# Patient Record
Sex: MALE | Race: WHITE | NOT HISPANIC OR LATINO
[De-identification: names, ages, dates, MRNs, and addresses within clinical notes are randomized per-mention and may not be internally consistent; named-entity substitution may affect disease eponyms.]

---

## 2018-01-18 PROBLEM — Z00.00 ENCOUNTER FOR PREVENTIVE HEALTH EXAMINATION: Status: ACTIVE | Noted: 2018-01-18

## 2018-02-15 ENCOUNTER — APPOINTMENT (OUTPATIENT)
Dept: PULMONOLOGY | Facility: CLINIC | Age: 53
End: 2018-02-15
Payer: COMMERCIAL

## 2018-02-15 VITALS
RESPIRATION RATE: 17 BRPM | OXYGEN SATURATION: 98 % | WEIGHT: 229 LBS | HEART RATE: 64 BPM | BODY MASS INDEX: 31.02 KG/M2 | HEIGHT: 72 IN | SYSTOLIC BLOOD PRESSURE: 116 MMHG | DIASTOLIC BLOOD PRESSURE: 82 MMHG

## 2018-02-15 DIAGNOSIS — K21.9 GASTRO-ESOPHAGEAL REFLUX DISEASE W/OUT ESOPHAGITIS: ICD-10-CM

## 2018-02-15 DIAGNOSIS — Z78.9 OTHER SPECIFIED HEALTH STATUS: ICD-10-CM

## 2018-02-15 DIAGNOSIS — Z87.19 PERSONAL HISTORY OF OTHER DISEASES OF THE DIGESTIVE SYSTEM: ICD-10-CM

## 2018-02-15 DIAGNOSIS — Z88.9 ALLERGY STATUS TO UNSPECIFIED DRUGS, MEDICAMENTS AND BIOLOGICAL SUBSTANCES: ICD-10-CM

## 2018-02-15 DIAGNOSIS — Z82.49 FAMILY HISTORY OF ISCHEMIC HEART DISEASE AND OTHER DISEASES OF THE CIRCULATORY SYSTEM: ICD-10-CM

## 2018-02-15 DIAGNOSIS — Z83.3 FAMILY HISTORY OF DIABETES MELLITUS: ICD-10-CM

## 2018-02-15 PROCEDURE — 94618 PULMONARY STRESS TESTING: CPT

## 2018-02-15 PROCEDURE — 99204 OFFICE O/P NEW MOD 45 MIN: CPT | Mod: 25

## 2018-02-15 PROCEDURE — 94010 BREATHING CAPACITY TEST: CPT | Mod: 59

## 2018-02-15 PROCEDURE — 71046 X-RAY EXAM CHEST 2 VIEWS: CPT

## 2018-02-15 RX ORDER — OLOPATADINE HYDROCHLORIDE 665 UG/1
0.6 SPRAY, METERED NASAL
Qty: 3 | Refills: 1 | Status: ACTIVE | COMMUNITY
Start: 2018-02-15 | End: 1900-01-01

## 2018-02-15 RX ORDER — CETIRIZINE HYDROCHLORIDE 10 MG/1
10 CAPSULE, LIQUID FILLED ORAL
Refills: 0 | Status: ACTIVE | COMMUNITY

## 2018-03-29 ENCOUNTER — APPOINTMENT (OUTPATIENT)
Dept: PULMONOLOGY | Facility: CLINIC | Age: 53
End: 2018-03-29

## 2018-07-06 ENCOUNTER — LABORATORY RESULT (OUTPATIENT)
Age: 53
End: 2018-07-06

## 2018-07-07 LAB
24R-OH-CALCIDIOL SERPL-MCNC: 62.6 PG/ML
25(OH)D3 SERPL-MCNC: 33 NG/ML
BASOPHILS # BLD AUTO: 0.02 K/UL
BASOPHILS NFR BLD AUTO: 0.4 %
EOSINOPHIL # BLD AUTO: 0.05 K/UL
EOSINOPHIL NFR BLD AUTO: 1.1 %
HCT VFR BLD CALC: 43.1 %
HGB BLD-MCNC: 14.6 G/DL
IGE SER-MCNC: 20 IU/ML
IMM GRANULOCYTES NFR BLD AUTO: 0.2 %
LYMPHOCYTES # BLD AUTO: 1.55 K/UL
LYMPHOCYTES NFR BLD AUTO: 33.1 %
MAN DIFF?: NORMAL
MCHC RBC-ENTMCNC: 30.3 PG
MCHC RBC-ENTMCNC: 33.9 GM/DL
MCV RBC AUTO: 89.4 FL
MONOCYTES # BLD AUTO: 0.41 K/UL
MONOCYTES NFR BLD AUTO: 8.8 %
NEUTROPHILS # BLD AUTO: 2.64 K/UL
NEUTROPHILS NFR BLD AUTO: 56.4 %
PLATELET # BLD AUTO: 221 K/UL
RBC # BLD: 4.82 M/UL
RBC # FLD: 13.2 %
WBC # FLD AUTO: 4.68 K/UL

## 2018-07-11 LAB
A ALTERNATA IGE QN: 2.88 KUA/L
A FUMIGATUS IGE QN: <0.1 KUA/L
C ALBICANS IGE QN: <0.1 KUA/L
C HERBARUM IGE QN: <0.1 KUA/L
CAT DANDER IGE QN: 0.11 KUA/L
CLAM IGE QN: <0.1 KUA/L
CODFISH IGE QN: <0.1 KUA/L
COMMON RAGWEED IGE QN: 0.25 KUA/L
CORN IGE QN: <0.1 KUA/L
COW MILK IGE QN: <0.1 KUA/L
D FARINAE IGE QN: <0.1 KUA/L
D PTERONYSS IGE QN: <0.1 KUA/L
DEPRECATED A ALTERNATA IGE RAST QL: ABNORMAL
DEPRECATED A FUMIGATUS IGE RAST QL: 0
DEPRECATED C ALBICANS IGE RAST QL: 0
DEPRECATED C HERBARUM IGE RAST QL: 0
DEPRECATED CAT DANDER IGE RAST QL: NORMAL
DEPRECATED CLAM IGE RAST QL: 0
DEPRECATED CODFISH IGE RAST QL: 0
DEPRECATED COMMON RAGWEED IGE RAST QL: NORMAL
DEPRECATED CORN IGE RAST QL: 0
DEPRECATED COW MILK IGE RAST QL: 0
DEPRECATED D FARINAE IGE RAST QL: 0
DEPRECATED D PTERONYSS IGE RAST QL: 0
DEPRECATED DOG DANDER IGE RAST QL: 0
DEPRECATED EGG WHITE IGE RAST QL: 0
DEPRECATED M RACEMOSUS IGE RAST QL: 0
DEPRECATED PEANUT IGE RAST QL: 0
DEPRECATED ROACH IGE RAST QL: 0
DEPRECATED SCALLOP IGE RAST QL: <0.1 KUA/L
DEPRECATED SESAME SEED IGE RAST QL: 0
DEPRECATED SHRIMP IGE RAST QL: 0
DEPRECATED SOYBEAN IGE RAST QL: 0
DEPRECATED TIMOTHY IGE RAST QL: 0
DEPRECATED WALNUT IGE RAST QL: 0
DEPRECATED WHEAT IGE RAST QL: 0
DEPRECATED WHITE OAK IGE RAST QL: 0
DOG DANDER IGE QN: <0.1 KUA/L
EGG WHITE IGE QN: <0.1 KUA/L
M RACEMOSUS IGE QN: <0.1 KUA/L
PEANUT IGE QN: <0.1 KUA/L
ROACH IGE QN: <0.1 KUA/L
SCALLOP IGE QN: 0
SCALLOP IGE QN: <0.1 KUA/L
SESAME SEED IGE QN: <0.1 KUA/L
SOYBEAN IGE QN: <0.1 KUA/L
TIMOTHY IGE QN: <0.1 KUA/L
WALNUT IGE QN: <0.1 KUA/L
WHEAT IGE QN: <0.1 KUA/L
WHITE OAK IGE QN: <0.1 KUA/L

## 2018-08-20 ENCOUNTER — NON-APPOINTMENT (OUTPATIENT)
Age: 53
End: 2018-08-20

## 2018-08-20 ENCOUNTER — APPOINTMENT (OUTPATIENT)
Dept: PULMONOLOGY | Facility: CLINIC | Age: 53
End: 2018-08-20
Payer: COMMERCIAL

## 2018-08-20 VITALS
DIASTOLIC BLOOD PRESSURE: 83 MMHG | HEIGHT: 72 IN | OXYGEN SATURATION: 97 % | BODY MASS INDEX: 29.26 KG/M2 | SYSTOLIC BLOOD PRESSURE: 121 MMHG | HEART RATE: 71 BPM | WEIGHT: 216 LBS

## 2018-08-20 PROCEDURE — 94010 BREATHING CAPACITY TEST: CPT

## 2018-08-20 PROCEDURE — 99214 OFFICE O/P EST MOD 30 MIN: CPT | Mod: 25

## 2018-08-20 RX ORDER — AZELASTINE HYDROCHLORIDE 205.5 UG/1
0.15 SPRAY, METERED NASAL TWICE DAILY
Qty: 3 | Refills: 1 | Status: ACTIVE | COMMUNITY
Start: 2018-08-20 | End: 1900-01-01

## 2018-09-20 ENCOUNTER — APPOINTMENT (OUTPATIENT)
Dept: PULMONOLOGY | Facility: CLINIC | Age: 53
End: 2018-09-20
Payer: COMMERCIAL

## 2018-09-20 VITALS
SYSTOLIC BLOOD PRESSURE: 122 MMHG | RESPIRATION RATE: 17 BRPM | DIASTOLIC BLOOD PRESSURE: 64 MMHG | OXYGEN SATURATION: 98 % | BODY MASS INDEX: 29.39 KG/M2 | HEIGHT: 72 IN | WEIGHT: 217 LBS | HEART RATE: 69 BPM

## 2018-09-20 PROCEDURE — 99214 OFFICE O/P EST MOD 30 MIN: CPT

## 2018-09-20 RX ORDER — BECLOMETHASONE DIPROPIONATE HFA 80 UG/1
80 AEROSOL, METERED RESPIRATORY (INHALATION)
Qty: 3 | Refills: 1 | Status: ACTIVE | COMMUNITY
Start: 2018-09-20 | End: 1900-01-01

## 2018-09-20 NOTE — HISTORY OF PRESENT ILLNESS
[FreeTextEntry1] : Mr. Cortes is a 53 year old male with a history of abnormal PFTs, allergic rhinitis, LPRD, snoring and SOB presenting to the office today for a follow up visit. His chief complaint is \par -he states that he has been having significant nasal congestion\par -he has been producing a clear-white phlegm typically in the morning\par -he notes that he has been wheezing and has had chest tightness\par -he reports that he typically sleeps very well\par -he notes that his hearing, smell and taste have been good\par -he reports that his sinus congestion makes his breathing poor, which makes him feel very fatigued\par -he notes that he has been using the navage and olopatadine nasal spray \par -he denies any headaches, nausea, vomiting, fever, chills, sweats, diarrhea, constipation, dysphagia, dizziness, leg swelling, leg pain, itchy eyes, itchy ears, heartburn, reflux, or sour taste in the mouth, muscle aches or pains, joint aches or pains.

## 2018-09-20 NOTE — ASSESSMENT
[FreeTextEntry1] : Mr. Cortes is a 51 y/o M with PMHx of GERD, mild allergies, deviated septum s/p repair presenting for pulmonary re-evaluation.\par \par SOB is multifactorial due to:\par -overweight \par -out of shape\par -poor mechanics of breathing\par -asthma \par -cardiac disease\par \par Problem 1: allergy/postnasal drip\par -s/p Blood work: asthma panel(+), food IgE panel (-), IgE level (-), eosinophil level (-), and vitamin D level (WNL)\par -recommended to use "Navage" nasal rinse device \par -add Qvar Redihaler 80 2 puffs BID \par -add Astelin 0.15% 1 sniff/nostril BID \par -continue Clarinex 5 mg QHS\par -add Singulair 10 mg QHS \par -OTC antihistamine QHS \par -recommended to use Breathe Right nasal strips at night \par \par \par Problem 2: r/o asthma\par -Methacholine challenge - (needed)\par -Breo Ellipta 100 1 puff QD sample given\par -Asthma is a recurring condition in which the airways tighten and narrow. Asthma can make it difficult to breathe. It can cause coughing, wheezing, and shortness of breath. Asthma episodes, also called asthma attacks, range from minor to life-threatening. Asthma cannot be cured, but medicines and lifestyle changes can help control it. Asthma is believed to be caused by inherited (genetic) and environmental factor, but its exact cause is unknown. Asthma may be triggered by allergens, lung infections, or irritants in the air. Asthma triggers are different for each person  \par -Inhaler technique reviewed as well as oral hygiene techniques reviewed with patient. Avoidance of cold air, extremes of temperature, rescue inhaler should be used before exercise. Order of medication reviewed with patient. Recommended use of a cool mist humidifier in the bedroom. Gargle and spit after use.\par \par Problem 3: poor mechanics of breathing\par -Proper breathing techniques were reviewed with an emphasis of exhalation. Patient instructed to breath in for 1 second and out for four seconds. Patient was encouraged to not talk while walking. \par \par Problem 4: GERD\par -Rule of 2s: avoid eating too much, eating too late, eating too spicy, eating two hours before bed\par -Things to avoid including overeating, spicy foods, tight clothing, eating within three hours of bed, this list is not all inclusive. \par -For treatment of reflux, possible options discussed including diet control, H2 blockers, PPIs, as well as coating motility agents discussed as treatment options. Timing of meals and proximity of last meal to sleep were discussed. If symptoms persist, a formal gastrointestinal evaluation is needed.\par \par Problem 5: r/o KATYA\par -abnormal inspiratory limb on PFTs\par -recommend home sleep study based on neck size and elevated Mallampati class\par Sleep apnea is associated with adverse clinical consequences which an affect most organ systems.  Cardiovascular disease risk includes arrhythmias, atrial fibrillation, hypertension, coronary artery disease, and stroke. Metabolic disorders include diabetes type 2, non-alcoholic fatty liver disease. Mood disorder especially depression; and cognitive decline especially in the elderly. Associations with  chronic reflux/Paz’s esophagus some but not all inclusive. \par -Reasons  include arousal consistent with hypopnea; respiratory events most prominent in REM sleep or supine position; therefore sleep staging and body position are important for accurate diagnosis and estimation of AHI. \par  \par Problem 6: health maintenance\par -encouraged to stay hydrated especially when exercising \par \par Follow up in 4 weeks.\par He is encouraged to call with any questions, concerns, or changes.

## 2018-09-20 NOTE — ADDENDUM
[FreeTextEntry1] : All medical record entries made by marysol Isabel were at Dr. Steven Sweet's, direction and personally dictated by me on (9/20/2018). I have reviewed the chart and agree that the record accurately reflects my personal performance of the history, physical exam, assessment and plan. I have also personally directed, reviewed, and agree with the discharge instructions.

## 2018-09-20 NOTE — REVIEW OF SYSTEMS
[Negative] : Sleep Disorder [Nasal Congestion] : nasal congestion [As Noted in HPI] : as noted in HPI [Sputum] : sputum  [Wheezing] : wheezing

## 2018-09-20 NOTE — REASON FOR VISIT
[Follow-Up] : a follow-up visit [FreeTextEntry1] : abnormal PFTs, allergic rhinitis, LPRD, snoring and SOB

## 2018-12-11 ENCOUNTER — APPOINTMENT (OUTPATIENT)
Dept: PULMONOLOGY | Facility: CLINIC | Age: 53
End: 2018-12-11

## 2019-01-07 ENCOUNTER — TRANSCRIPTION ENCOUNTER (OUTPATIENT)
Age: 54
End: 2019-01-07

## 2019-01-12 ENCOUNTER — RX RENEWAL (OUTPATIENT)
Age: 54
End: 2019-01-12

## 2019-01-14 ENCOUNTER — MEDICATION RENEWAL (OUTPATIENT)
Age: 54
End: 2019-01-14

## 2019-01-14 ENCOUNTER — RX RENEWAL (OUTPATIENT)
Age: 54
End: 2019-01-14

## 2019-01-14 RX ORDER — MONTELUKAST 10 MG/1
10 TABLET, FILM COATED ORAL
Qty: 1 | Refills: 1 | Status: ACTIVE | COMMUNITY
Start: 2018-09-20 | End: 1900-01-01

## 2019-03-03 ENCOUNTER — TRANSCRIPTION ENCOUNTER (OUTPATIENT)
Age: 54
End: 2019-03-03

## 2019-03-11 ENCOUNTER — NON-APPOINTMENT (OUTPATIENT)
Age: 54
End: 2019-03-11

## 2019-03-11 ENCOUNTER — APPOINTMENT (OUTPATIENT)
Dept: PULMONOLOGY | Facility: CLINIC | Age: 54
End: 2019-03-11
Payer: COMMERCIAL

## 2019-03-11 VITALS
BODY MASS INDEX: 28.31 KG/M2 | HEIGHT: 72 IN | WEIGHT: 209 LBS | SYSTOLIC BLOOD PRESSURE: 114 MMHG | RESPIRATION RATE: 17 BRPM | DIASTOLIC BLOOD PRESSURE: 60 MMHG | OXYGEN SATURATION: 94 % | HEART RATE: 83 BPM

## 2019-03-11 DIAGNOSIS — J30.9 ALLERGIC RHINITIS, UNSPECIFIED: ICD-10-CM

## 2019-03-11 DIAGNOSIS — R06.83 SNORING: ICD-10-CM

## 2019-03-11 DIAGNOSIS — R94.2 ABNORMAL RESULTS OF PULMONARY FUNCTION STUDIES: ICD-10-CM

## 2019-03-11 DIAGNOSIS — K21.9 GASTRO-ESOPHAGEAL REFLUX DISEASE W/OUT ESOPHAGITIS: ICD-10-CM

## 2019-03-11 DIAGNOSIS — R06.02 SHORTNESS OF BREATH: ICD-10-CM

## 2019-03-11 PROCEDURE — 99214 OFFICE O/P EST MOD 30 MIN: CPT | Mod: 25

## 2019-03-11 PROCEDURE — 94010 BREATHING CAPACITY TEST: CPT

## 2019-03-11 RX ORDER — RANITIDINE 300 MG/1
300 TABLET ORAL
Qty: 90 | Refills: 1 | Status: ACTIVE | COMMUNITY
Start: 2019-03-11 | End: 1900-01-01

## 2019-03-11 NOTE — HISTORY OF PRESENT ILLNESS
[FreeTextEntry1] : Mr. Cortes is a 53 year old male with a history of abnormal PFTs, allergic rhinitis, LPRD, snoring and SOB presenting to the office today for a follow up visit. His chief complaint is mucus production\par - While his chest pain and  pressure has improved, the amount of sputum he coughs up has increased (about the size of a golf ball)\par - He notes that it occurs morning into the afternoon\par - He states that for the last few weeks, his sputum has been green. \par - After bringing up sputum, he feels improved\par - In the past, his sputum has included blood. \par - he has had no issues with running aside from soreness in his legs, which he expects. \par - He has lost around 10 pounds through diet modification. \par - He states that he is unable to breathe through his nose\par - he wakes up every morning around 3-4 AM to spit up. \par - He denies any headaches, nausea, vomiting, fever, chills, sweats, palpitations, SOB, coughing, wheezing, fatigue, diarrhea, constipation, dysphagia, myalgias, dizziness, leg swelling, leg pain, itchy eyes, itchy ears, heartburn, reflux, or sour taste in the mouth.

## 2019-03-11 NOTE — PROCEDURE
[FreeTextEntry1] : PFT- spi reveals mild obstructive dysfunction; FEV1 was 3.17L which is 76% of predicted; normal flow volume loop

## 2019-03-11 NOTE — ADDENDUM
[FreeTextEntry1] : Documented by Fab Bess acting as a scribe for Dr. Steven Sweet on 3/11/2019\par \par All medical record entries made by the Scribe were at my, Dr. Steven Sweet's, direction and personally dictated by me on 3/11/2019. I have reviewed the chart and agree that the record accurately reflects my personal performance of the history, physical exam, assessment and plan. I have also personally directed, reviewed, and agree with the discharge instructions. \par \par \par \par \par

## 2019-03-11 NOTE — PHYSICAL EXAM
[General Appearance - Well Developed] : well developed [Normal Appearance] : normal appearance [Well Groomed] : well groomed [General Appearance - Well Nourished] : well nourished [No Deformities] : no deformities [General Appearance - In No Acute Distress] : no acute distress [Normal Conjunctiva] : the conjunctiva exhibited no abnormalities [Eyelids - No Xanthelasma] : the eyelids demonstrated no xanthelasmas [Normal Oropharynx] : normal oropharynx [II] : II [Neck Appearance] : the appearance of the neck was normal [Neck Cervical Mass (___cm)] : no neck mass was observed [Jugular Venous Distention Increased] : there was no jugular-venous distention [Thyroid Diffuse Enlargement] : the thyroid was not enlarged [Thyroid Nodule] : there were no palpable thyroid nodules [Heart Rate And Rhythm] : heart rate and rhythm were normal [Heart Sounds] : normal S1 and S2 [Murmurs] : no murmurs present [Respiration, Rhythm And Depth] : normal respiratory rhythm and effort [Exaggerated Use Of Accessory Muscles For Inspiration] : no accessory muscle use [Auscultation Breath Sounds / Voice Sounds] : lungs were clear to auscultation bilaterally [Abdomen Soft] : soft [Abdomen Tenderness] : non-tender [Abdomen Mass (___ Cm)] : no abdominal mass palpated [Abnormal Walk] : normal gait [Gait - Sufficient For Exercise Testing] : the gait was sufficient for exercise testing [Nail Clubbing] : no clubbing of the fingernails [Cyanosis, Localized] : no localized cyanosis [Petechial Hemorrhages (___cm)] : no petechial hemorrhages [Skin Color & Pigmentation] : normal skin color and pigmentation [] : no rash [No Venous Stasis] : no venous stasis [Skin Lesions] : no skin lesions [No Skin Ulcers] : no skin ulcer [No Xanthoma] : no  xanthoma was observed [Deep Tendon Reflexes (DTR)] : deep tendon reflexes were 2+ and symmetric [Sensation] : the sensory exam was normal to light touch and pinprick [No Focal Deficits] : no focal deficits [Oriented To Time, Place, And Person] : oriented to person, place, and time [Impaired Insight] : insight and judgment were intact [Affect] : the affect was normal [FreeTextEntry1] : Ventral hernia

## 2019-03-11 NOTE — ASSESSMENT
[FreeTextEntry1] : Mr. Cortes is a 51 y/o M with PMHx of GERD, mild allergies, deviated septum s/p repair presenting for pulmonary re-evaluation. He presents to the office still symptomatic of mucus prodcution. \par \par SOB is multifactorial due to:\par -overweight \par -out of shape\par -poor mechanics of breathing\par -asthma \par -cardiac disease\par \par Problem 1: allergy/postnasal drip\par -s/p Blood work: asthma panel(+), food IgE panel (-), IgE level (-), eosinophil level (-), and vitamin D level (WNL)\par -recommended to use "Navage" nasal rinse device \par - Continue Qvar Redihaler 80 2 puffs BID \par - Continue  Astelin 0.15% 1 sniff/nostril BID \par -continue Clarinex 5 mg QHS\par - Continue Singulair 10 mg QHS \par -OTC antihistamine QHS \par -recommended to use Breathe Right nasal strips at night \par \par \par Problem 2: r/o asthma\par -Methacholine challenge - (needed)\par - Add Trelegy 1 inhalation QD \par -Asthma is a recurring condition in which the airways tighten and narrow. Asthma can make it difficult to breathe. It can cause coughing, wheezing, and shortness of breath. Asthma episodes, also called asthma attacks, range from minor to life-threatening. Asthma cannot be cured, but medicines and lifestyle changes can help control it. Asthma is believed to be caused by inherited (genetic) and environmental factor, but its exact cause is unknown. Asthma may be triggered by allergens, lung infections, or irritants in the air. Asthma triggers are different for each person  \par -Inhaler technique reviewed as well as oral hygiene techniques reviewed with patient. Avoidance of cold air, extremes of temperature, rescue inhaler should be used before exercise. Order of medication reviewed with patient. Recommended use of a cool mist humidifier in the bedroom. Gargle and spit after use.\par \par Problem 3: poor mechanics of breathing\par -Proper breathing techniques were reviewed with an emphasis of exhalation. Patient instructed to breath in for 1 second and out for four seconds. Patient was encouraged to not talk while walking. \par \par Problem 4: GERD\par - Add Omeprazole 40 mg before breakfast\par -Rule of 2s: avoid eating too much, eating too late, eating too spicy, eating two hours before bed\par -Things to avoid including overeating, spicy foods, tight clothing, eating within three hours of bed, this list is not all inclusive. \par -For treatment of reflux, possible options discussed including diet control, H2 blockers, PPIs, as well as coating motility agents discussed as treatment options. Timing of meals and proximity of last meal to sleep were discussed. If symptoms persist, a formal gastrointestinal evaluation is needed.\par \par Problem 5: r/o KATYA\par -abnormal inspiratory limb on PFTs\par -recommend home sleep study based on neck size and elevated Mallampati class\par Sleep apnea is associated with adverse clinical consequences which an affect most organ systems.  Cardiovascular disease risk includes arrhythmias, atrial fibrillation, hypertension, coronary artery disease, and stroke. Metabolic disorders include diabetes type 2, non-alcoholic fatty liver disease. Mood disorder especially depression; and cognitive decline especially in the elderly. Associations with  chronic reflux/Paz’s esophagus some but not all inclusive. \par -Reasons  include arousal consistent with hypopnea; respiratory events most prominent in REM sleep or supine position; therefore sleep staging and body position are important for accurate diagnosis and estimation of AHI. \par  \par Problem 6: health maintenance\par -encouraged to stay hydrated especially when exercising \par \par Follow up in 4 weeks.\par He is encouraged to call with any questions, concerns, or changes.

## 2019-03-19 ENCOUNTER — RX RENEWAL (OUTPATIENT)
Age: 54
End: 2019-03-19

## 2019-03-19 RX ORDER — DESLORATADINE 5 MG/1
5 TABLET ORAL
Qty: 30 | Refills: 5 | Status: ACTIVE | COMMUNITY
Start: 2018-08-20 | End: 1900-01-01

## 2019-03-29 ENCOUNTER — APPOINTMENT (OUTPATIENT)
Dept: PULMONOLOGY | Facility: CLINIC | Age: 54
End: 2019-03-29
Payer: COMMERCIAL

## 2019-03-29 VITALS — WEIGHT: 204 LBS | HEIGHT: 70.08 IN | BODY MASS INDEX: 29.2 KG/M2

## 2019-03-29 PROCEDURE — 95070 INHLJ BRNCL CHALLENGE TSTG: CPT

## 2019-03-29 PROCEDURE — 94070 EVALUATION OF WHEEZING: CPT

## 2019-04-08 DIAGNOSIS — J45.909 UNSPECIFIED ASTHMA, UNCOMPLICATED: ICD-10-CM

## 2019-04-08 RX ORDER — ALBUTEROL SULFATE 90 UG/1
108 (90 BASE) AEROSOL, METERED RESPIRATORY (INHALATION)
Qty: 1 | Refills: 3 | Status: ACTIVE | COMMUNITY
Start: 2019-04-08 | End: 1900-01-01

## 2019-04-08 RX ORDER — FLUTICASONE FUROATE AND VILANTEROL TRIFENATATE 200; 25 UG/1; UG/1
200-25 POWDER RESPIRATORY (INHALATION) DAILY
Qty: 1 | Refills: 3 | Status: ACTIVE | COMMUNITY
Start: 2019-04-08 | End: 1900-01-01

## 2019-07-23 ENCOUNTER — APPOINTMENT (OUTPATIENT)
Dept: PULMONOLOGY | Facility: CLINIC | Age: 54
End: 2019-07-23

## 2023-03-05 ENCOUNTER — EMERGENCY (EMERGENCY)
Facility: HOSPITAL | Age: 58
LOS: 1 days | Discharge: HOME | End: 2023-03-05
Attending: STUDENT IN AN ORGANIZED HEALTH CARE EDUCATION/TRAINING PROGRAM | Admitting: STUDENT IN AN ORGANIZED HEALTH CARE EDUCATION/TRAINING PROGRAM
Payer: COMMERCIAL

## 2023-03-05 VITALS
HEART RATE: 64 BPM | DIASTOLIC BLOOD PRESSURE: 56 MMHG | SYSTOLIC BLOOD PRESSURE: 101 MMHG | RESPIRATION RATE: 14 BRPM | OXYGEN SATURATION: 100 % | TEMPERATURE: 98 F

## 2023-03-05 VITALS
TEMPERATURE: 98 F | RESPIRATION RATE: 14 BRPM | HEART RATE: 73 BPM | DIASTOLIC BLOOD PRESSURE: 78 MMHG | SYSTOLIC BLOOD PRESSURE: 113 MMHG | OXYGEN SATURATION: 100 %

## 2023-03-05 PROCEDURE — 73552 X-RAY EXAM OF FEMUR 2/>: CPT | Mod: 26,RT

## 2023-03-05 PROCEDURE — 73590 X-RAY EXAM OF LOWER LEG: CPT | Mod: 26,RT

## 2023-03-05 PROCEDURE — 99285 EMERGENCY DEPT VISIT HI MDM: CPT

## 2023-03-05 PROCEDURE — 73502 X-RAY EXAM HIP UNI 2-3 VIEWS: CPT | Mod: 26,RT

## 2023-03-05 PROCEDURE — 73700 CT LOWER EXTREMITY W/O DYE: CPT | Mod: 26,RT,MA

## 2023-03-05 PROCEDURE — 73562 X-RAY EXAM OF KNEE 3: CPT | Mod: 26,RT

## 2023-03-05 PROCEDURE — 73610 X-RAY EXAM OF ANKLE: CPT | Mod: 26,RT

## 2023-03-05 RX ORDER — TETANUS TOXOID, REDUCED DIPHTHERIA TOXOID AND ACELLULAR PERTUSSIS VACCINE, ADSORBED 5; 2.5; 8; 8; 2.5 [IU]/.5ML; [IU]/.5ML; UG/.5ML; UG/.5ML; UG/.5ML
0.5 SUSPENSION INTRAMUSCULAR ONCE
Refills: 0 | Status: COMPLETED | OUTPATIENT
Start: 2023-03-05 | End: 2023-03-05

## 2023-03-05 RX ORDER — IBUPROFEN 200 MG
600 TABLET ORAL ONCE
Refills: 0 | Status: COMPLETED | OUTPATIENT
Start: 2023-03-05 | End: 2023-03-05

## 2023-03-05 RX ORDER — OXYCODONE HYDROCHLORIDE 5 MG/1
5 TABLET ORAL ONCE
Refills: 0 | Status: DISCONTINUED | OUTPATIENT
Start: 2023-03-05 | End: 2023-03-05

## 2023-03-05 RX ORDER — KETOROLAC TROMETHAMINE 30 MG/ML
15 SYRINGE (ML) INJECTION ONCE
Refills: 0 | Status: DISCONTINUED | OUTPATIENT
Start: 2023-03-05 | End: 2023-03-05

## 2023-03-05 RX ORDER — ACETAMINOPHEN 500 MG
975 TABLET ORAL ONCE
Refills: 0 | Status: COMPLETED | OUTPATIENT
Start: 2023-03-05 | End: 2023-03-05

## 2023-03-05 RX ORDER — OXYCODONE HYDROCHLORIDE 5 MG/1
1 TABLET ORAL
Qty: 12 | Refills: 0
Start: 2023-03-05 | End: 2023-03-08

## 2023-03-05 RX ORDER — MORPHINE SULFATE 50 MG/1
4 CAPSULE, EXTENDED RELEASE ORAL ONCE
Refills: 0 | Status: DISCONTINUED | OUTPATIENT
Start: 2023-03-05 | End: 2023-03-05

## 2023-03-05 RX ADMIN — Medication 600 MILLIGRAM(S): at 14:42

## 2023-03-05 RX ADMIN — OXYCODONE HYDROCHLORIDE 5 MILLIGRAM(S): 5 TABLET ORAL at 14:42

## 2023-03-05 RX ADMIN — Medication 975 MILLIGRAM(S): at 14:12

## 2023-03-05 RX ADMIN — Medication 600 MILLIGRAM(S): at 14:12

## 2023-03-05 RX ADMIN — OXYCODONE HYDROCHLORIDE 5 MILLIGRAM(S): 5 TABLET ORAL at 14:12

## 2023-03-05 RX ADMIN — Medication 975 MILLIGRAM(S): at 14:42

## 2023-03-05 RX ADMIN — TETANUS TOXOID, REDUCED DIPHTHERIA TOXOID AND ACELLULAR PERTUSSIS VACCINE, ADSORBED 0.5 MILLILITER(S): 5; 2.5; 8; 8; 2.5 SUSPENSION INTRAMUSCULAR at 17:56

## 2023-03-05 RX ADMIN — Medication 15 MILLIGRAM(S): at 19:09

## 2023-03-05 NOTE — ED PROVIDER NOTE - PROVIDER TOKENS
PROVIDER:[TOKEN:[[id number]],FOLLOWUP:[1-3 Days]],PROVIDER:[TOKEN:[[id number]],FOLLOWUP:[1-3 Days]]

## 2023-03-05 NOTE — ED ADULT TRIAGE NOTE - CHIEF COMPLAINT QUOTE
pt c/o right knee pain after twisting injury today, unable to ambulate freely/ weight bear. PMH: GERD

## 2023-03-05 NOTE — ED PROVIDER NOTE - CARE PROVIDER_API CALL
Steven Arauz (MD)  Orthopedics  [address] [facility], [address] [place] [id number]  Phone: [phone]  Fax: [phone]  Follow Up Time: 1-3 Days    Emigdio Sanchez)  Orthopaedic Surgery  [address] [facility], [facility] [address]  [place] [address]  Phone: [phone]  Fax: [phone]  Follow Up Time: 1-3 Days

## 2023-03-05 NOTE — ED PROVIDER NOTE - ATTENDING CONTRIBUTION TO CARE
I have personally performed a face to face medical and diagnostic evaluation of the patient. I have discussed with and reviewed the Resident's note and agree with the History, ROS, Physical Exam and MDM unless otherwise indicated. A brief summary of my personal evaluation and impression can be found below.    Vern ATKINSON: 57-year-old female, no past medical history, status post prior meniscus repair in the 90s, presents with a chief complaint of right knee pain and swelling that started suddenly today after a twisting injury when he was attempting to put back a shopping cart, patient report he twisted awkwardly felt a popping sensation and then collapsed, no head strike no LOC recalls entire event, has been noticing increasing pain swelling and numbness and tingling sensation to the distal aspect of his right lower extremity prompting ED visit, noted trace abrasions to his right shin, he can distally move everything and feel everything was reporting some subjective numbness, no other injuries no other complaints.  He reports he is having difficulty ambulating since event is having difficulty straightening of the leg secondary to discomfort.    All other ROS negative, except as above and as per HPI and ROS section.    VITALS: Initial triage and subsequent vitals have been reviewed by me.  GEN APPEARANCE: WDWN, alert, non-toxic, NAD  HEAD: Atraumatic.  EYES: PERRLa, EOMI, vision grossly intact.   NECK: Supple  CV: RRR, S1S2, no c/r/m/g. Cap refill <2 seconds. No bruits.   LUNGS: CTAB. No abnormal breath sounds.  ABDOMEN: Soft, NTND. No guarding or rebound.   MSK/EXT: right mid thigh swelling w tenderness to the right knee, swelling proceeds down to the right lower extremity, his compartments are soft, +2 DP, distally neurovascular intact, limited range of motion of the right knee secondary to discomfort otherwise, No spinal or extremity point tenderness. No CVA ttp. Pelvis stable. No peripheral edema.  NEURO: Alert, follows commands. Weight bearing normal. Speech normal. Sensation and motor normal x4 extremities.   SKIN: Warm, dry and intact. No rash.  PSYCH: Appropriate    Plan/MDM: Vern ATKINSON: 57-year-old female, no past medical history, status post prior meniscus repair in the 90s, presents with a chief complaint of right knee pain and swelling that started suddenly today after a twisting injury when he was attempting to put back a shopping cart, patient report he twisted awkwardly felt a popping sensation and then collapsed, no head strike no LOC recalls entire event, has been noticing increasing pain swelling and numbness and tingling sensation to the distal aspect of his right lower extremity prompting ED visit, noted trace abrasions to his right shin, he can distally move everything and feel everything was reporting some subjective numbness, no other injuries no other complaints.  He reports he is having difficulty ambulating since event is having difficulty straightening of the leg secondary to discomfort. Exam vital signs stable nontoxic-appearing physical exam as above, DDx unclear etiology of patient's episode, consider meniscal versus tendon injury or ligament injury, consider possible fracture though less likely given that patient did not have a direct fall and only twisted his knee, dislocation relocation is possible, however he appears to have a good distal lower extremity pulse can move everything and feel everything, his presentation is not consistent with compartment syndrome as his compartments are soft, and he is distally neurovascular tact, will check x-ray, consider CTA to evaluate for popliteal artery injury though less likely, will consider orthopedics consult as indicated, presentation not concerning for septic joint as there is no fever likewise as above dictated by injury, will give meds and reassess and dispo accordingly thereafter.

## 2023-03-05 NOTE — ED ADULT NURSE REASSESSMENT NOTE - NS ED NURSE REASSESS COMMENT FT1
patient educated to wait for ortho to come immobilize his leg prior to going to CT per ortho recs. patient expresses understanding. CT wait times explained to patient and family member at bedside, verbalizes understanding.

## 2023-03-05 NOTE — ED PROVIDER NOTE - OBJECTIVE STATEMENT
57 year old male with hx of right meniscus repair in 1994 comes into the ED for evaluation of right knee pain after he twisted it while walking. He was putting away a shopping cart and turned quickly and twisted his knee. He notes feeling a pop in his knee and he fell to the ground where he possible also rolled his right ankle. Immediately after he was not able to bear any weight on the right leg, he was not able to straighten out the leg, and he noticed the knee and his ankle began to swell. Using a pole and his wife for support, he went to an urgent care where he was sent to the ED. He has significant pain to the right knee and is unable to move it at all secondary to pain. He denies any trauma to his head. He is not on any medications including blood thinners.

## 2023-03-05 NOTE — ED ADULT NURSE NOTE - NSIMPLEMENTINTERV_GEN_ALL_ED
Implemented All Fall Risk Interventions:  [place] to call system. Call bell, personal items and telephone within reach. Instruct patient to call for assistance. Room bathroom lighting operational. Non-slip footwear when patient is off stretcher. Physically safe environment: no spills, clutter or unnecessary equipment. Stretcher in lowest position, wheels locked, appropriate side rails in place. Provide visual cue, wrist band, yellow gown, etc. Monitor gait and stability. Monitor for mental status changes and reorient to person, place, and time. Review medications for side effects contributing to fall risk. Reinforce activity limits and safety measures with patient and family.

## 2023-03-05 NOTE — ED ADULT TRIAGE NOTE - GLASGOW COMA SCALE: BEST VERBAL RESPONSE, MLM
"Interval HPI:   Overnight events:    Pt with low BG in AM due to insulin stacking last night    In addition noted to have elevated BG at 11AM due to pt not receiving pre-meal insulin for his breakfast    Eatin%  Nausea: No  Hypoglycemia and intervention: No  Fever: No  TPN and/or TF: No  If yes, type of TF/TPN and rate: NA    BP (!) 162/79 (BP Location: Right arm, Patient Position: Lying)   Pulse 72   Temp 97.7 °F (36.5 °C) (Oral)   Resp 20   Ht 6' 2" (1.88 m)   Wt 76.4 kg (168 lb 6.9 oz)   SpO2 97%   BMI 21.63 kg/m²     Labs Reviewed and Include    Recent Labs   Lab 22  0556   GLU 51*   CALCIUM 8.1*   ALBUMIN 2.0*   PROT 6.2      K 3.6   CO2 29      BUN 11   CREATININE 0.7   ALKPHOS 122   ALT 16   AST 27   BILITOT 0.5     Lab Results   Component Value Date    WBC 7.34 2022    HGB 11.6 (L) 2022    HCT 36.2 (L) 2022    MCV 86 2022     2022     No results for input(s): TSH, FREET4 in the last 168 hours.  Lab Results   Component Value Date    HGBA1C 11.5 (H) 2022       Nutritional status:   Body mass index is 21.63 kg/m².  Lab Results   Component Value Date    ALBUMIN 2.0 (L) 2022    ALBUMIN 1.9 (L) 2022    ALBUMIN 1.9 (L) 2022     No results found for: PREALBUMIN    Estimated Creatinine Clearance: 94 mL/min (based on SCr of 0.7 mg/dL).    Accu-Checks  Recent Labs     22  0743 22  1154 22  1635 22  1854 22  0742 22  0814 22  0947 22  1100   POCTGLUCOSE 299* 119* 196* 351* 380* 372* 45* 69* 171* 278*       Current Medications and/or Treatments Impacting Glycemic Control  Immunotherapy:    Immunosuppressants       None          Steroids:   Hormones (From admission, onward)                Start     Stop Route Frequency Ordered    22 182  melatonin tablet 6 mg         -- Oral Nightly PRN 22 1724          Pressors:    Autonomic Drugs (From " admission, onward)                None          Hyperglycemia/Diabetes Medications:   Antihyperglycemics (From admission, onward)                Start     Stop Route Frequency Ordered    03/06/22 2100  insulin detemir U-100 pen 8 Units         -- SubQ Nightly 03/06/22 0854    03/06/22 1130  insulin aspart U-100 pen 4-8 Units         -- SubQ 3 times daily with meals 03/06/22 1043    03/05/22 1145  insulin aspart U-100 pen 0-5 Units         -- SubQ Before meals, nightly and at 0100 PRN 03/05/22 1034           (V5) oriented

## 2023-03-05 NOTE — ED ADULT NURSE NOTE - OBJECTIVE STATEMENT
Received patient in Intake 10B c/o right knee pain, hx of injury today. Patient is A&OX4, airway patent, breathing unlabored and even. Medications given as ordered. Side rails up and safety maintained. Fall precaution in place. Received patient in Intake 10B c/o right knee and right ankle injury today, patient reports abrasions on right knee, right ankle is swollen and patient fell today. Patient is A&OX4, airway patent, breathing unlabored and even. Medications given as ordered. Side rails up and safety maintained. Fall precaution in place. Received patient in Intake 10B c/o right knee pain and right ankle pain hx of injury today. Patient c/o abrasions on right knee, right ankle is swollen and patient fell today. Patient is A&OX4, airway patent, breathing unlabored and even. Medications given as ordered. Side rails up and safety maintained. Fall precaution in place.

## 2023-03-05 NOTE — ED PROVIDER NOTE - PATIENT PORTAL LINK FT
You can access the FollowMyHealth Patient Portal offered by [facility] by registering at the following website: [email or web address]. By joining [facility]’s FollowMyHealth portal, you will also be able to view your health information using other applications (apps) compatible with our system.

## 2023-03-05 NOTE — ED PROVIDER NOTE - PROGRESS NOTE DETAILS
Butch ATKINSON: Pt is stable and ready for discharge. Strict return precautions given. All questions answered. Butch ATKINSON: Pt is stable and ready for discharge. Strict return precautions given. All questions answered. Spoke w/ orthopedics resident Jaki Worrell who agrees w/ plan to discharge as there are no signs of compartment syndrome and pt is tolerating pain.
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
## 2023-03-05 NOTE — CONSULT NOTE ADULT - SUBJECTIVE AND OBJECTIVE BOX
Orthopedic Surgery Consult Note    This is a 57y Male who presents to the ED today with c/o right knee pain, swelling and unable to bear weight s/p after a twist and fall while returning a grocery cart. No headstrike/LOC. Patient has been unable to ambulate since the fall. Patient denies numbness/tingling in affected extremity. Denies other bone/joint complaints. Patient is an independent community ambulator at baseline. Patient has a prior R knee arthroscopic partial medial meniscectomy performed at an OSH in the 1990s.    No Known Allergies        PE  RLE:  Superficial abrasions, no concern for open injury  Swelling about knee and proximal lower leg, no erythema or ecchymosis  Motor intact EHL/FHL/TA/Gastrocnemius  Painless ROM hip/ankle  SILT DP/SP/S/S/T nerve distributions  Compartments soft and compressible, no pain with passive stretch of anterior/posterior/lateral compartments  2+ Dorsalis Pedis pulse     Secondary: No TTP over bony prominences. SILT b/l, ROM intact b/l. Distal pulses palpable.     Imaging:  X-rays of the right knee demonstrate R Schatzker 2 tibial plateau fracture    Procedure:  Application of bulky farmer dressing and knee immobilizer. Patient tolerated procedure well and was NVI after    Assessment:  57y Male with a right tibial plateau fracture.    Plan:  - NWB RLE in bulky farmer knee immobilizer  - CT R knee for preop planning  - Pain control  - ice, elevation  - Pt advised surgical fixation of proximal tibia will be necessary; pt understands and is agreeable for surgery    Splint precautions:  Keep splint dry  Elevate extremity, can try and ice through the splint  Do not stick anything into the splint  Monitor for signs of pressure build up from swelling: pain not controlled with Tylenol/motrin, severe pain when moves the fingers/toes, numbness/tingling. If signs develop, call the office or return to ED immediately.     Patient should follow up with Dr. Carrasco in the office this week, call [phone] for appt.

## 2023-03-05 NOTE — ED ADULT NURSE REASSESSMENT NOTE - NS ED NURSE REASSESS COMMENT FT1
pt medicated as ordered. md to reevaluate for d/c instructions. evaluate by ortho resident earlier ,splint in place r leg

## 2023-03-05 NOTE — ED PROVIDER NOTE - NS ED MD DISPO DISCHARGE CCDA
Dr. Barrios Sites @ bedside for consult.
Dr. Gil @ bedside. Verbal order form Dr. Emory Cardenas mcg Digoxin IVP now.  RBV
Dr. Josse Kang notified patient remains in [place] flutter . Teephone orders received to titrate Cardizem drip up to 15mg/he and give Metoprolol 5 mg IVP now.  RBV
Patient reports feeling dizzy. FSBS 47. Dr. Mukund Morley notified. Verbal orders for 1 amp D 50% IVP. RBV.
Patient/Caregiver provided printed discharge information.

## 2023-03-07 ENCOUNTER — NON-APPOINTMENT (OUTPATIENT)
Age: 58
End: 2023-03-07

## 2023-03-08 ENCOUNTER — APPOINTMENT (OUTPATIENT)
Dept: ORTHOPEDIC SURGERY | Facility: CLINIC | Age: 58
End: 2023-03-08
Payer: COMMERCIAL

## 2023-03-08 ENCOUNTER — NON-APPOINTMENT (OUTPATIENT)
Age: 58
End: 2023-03-08

## 2023-03-08 ENCOUNTER — APPOINTMENT (OUTPATIENT)
Dept: ORTHOPEDIC SURGERY | Facility: CLINIC | Age: 58
End: 2023-03-08

## 2023-03-08 VITALS
SYSTOLIC BLOOD PRESSURE: 106 MMHG | BODY MASS INDEX: 30.06 KG/M2 | HEIGHT: 70 IN | WEIGHT: 210 LBS | HEART RATE: 92 BPM | DIASTOLIC BLOOD PRESSURE: 67 MMHG

## 2023-03-08 PROCEDURE — 99203 OFFICE O/P NEW LOW 30 MIN: CPT

## 2023-03-09 ENCOUNTER — TRANSCRIPTION ENCOUNTER (OUTPATIENT)
Age: 58
End: 2023-03-09

## 2023-03-09 ENCOUNTER — OUTPATIENT (OUTPATIENT)
Dept: OUTPATIENT SERVICES | Facility: HOSPITAL | Age: 58
LOS: 1 days | End: 2023-03-09
Payer: COMMERCIAL

## 2023-03-09 VITALS
TEMPERATURE: 98 F | HEART RATE: 88 BPM | WEIGHT: 210.1 LBS | DIASTOLIC BLOOD PRESSURE: 82 MMHG | HEIGHT: 72 IN | OXYGEN SATURATION: 98 % | RESPIRATION RATE: 16 BRPM | SYSTOLIC BLOOD PRESSURE: 120 MMHG

## 2023-03-09 DIAGNOSIS — Z98.890 OTHER SPECIFIED POSTPROCEDURAL STATES: Chronic | ICD-10-CM

## 2023-03-09 DIAGNOSIS — S82.141A DISPLACED BICONDYLAR FRACTURE OF RIGHT TIBIA, INITIAL ENCOUNTER FOR CLOSED FRACTURE: ICD-10-CM

## 2023-03-09 DIAGNOSIS — Z01.818 ENCOUNTER FOR OTHER PREPROCEDURAL EXAMINATION: ICD-10-CM

## 2023-03-09 LAB
ANION GAP SERPL CALC-SCNC: 14 MMOL/L — SIGNIFICANT CHANGE UP (ref 5–17)
BUN SERPL-MCNC: 20 MG/DL — SIGNIFICANT CHANGE UP (ref 7–23)
CALCIUM SERPL-MCNC: 9.2 MG/DL — SIGNIFICANT CHANGE UP (ref 8.4–10.5)
CHLORIDE SERPL-SCNC: 103 MMOL/L — SIGNIFICANT CHANGE UP (ref 96–108)
CO2 SERPL-SCNC: 23 MMOL/L — SIGNIFICANT CHANGE UP (ref 22–31)
CREAT SERPL-MCNC: 0.89 MG/DL — SIGNIFICANT CHANGE UP (ref 0.5–1.3)
EGFR: 101 ML/MIN/1.73M2 — SIGNIFICANT CHANGE UP
GLUCOSE SERPL-MCNC: 100 MG/DL — HIGH (ref 70–99)
HCT VFR BLD CALC: 40.6 % — SIGNIFICANT CHANGE UP (ref 39–50)
HGB BLD-MCNC: 13.6 G/DL — SIGNIFICANT CHANGE UP (ref 13–17)
MCHC RBC-ENTMCNC: 30.4 PG — SIGNIFICANT CHANGE UP (ref 27–34)
MCHC RBC-ENTMCNC: 33.5 GM/DL — SIGNIFICANT CHANGE UP (ref 32–36)
MCV RBC AUTO: 90.6 FL — SIGNIFICANT CHANGE UP (ref 80–100)
NRBC # BLD: 0 /100 WBCS — SIGNIFICANT CHANGE UP (ref 0–0)
PLATELET # BLD AUTO: 188 K/UL — SIGNIFICANT CHANGE UP (ref 150–400)
POTASSIUM SERPL-MCNC: 4.2 MMOL/L — SIGNIFICANT CHANGE UP (ref 3.5–5.3)
POTASSIUM SERPL-SCNC: 4.2 MMOL/L — SIGNIFICANT CHANGE UP (ref 3.5–5.3)
RBC # BLD: 4.48 M/UL — SIGNIFICANT CHANGE UP (ref 4.2–5.8)
RBC # FLD: 12.1 % — SIGNIFICANT CHANGE UP (ref 10.3–14.5)
SODIUM SERPL-SCNC: 140 MMOL/L — SIGNIFICANT CHANGE UP (ref 135–145)
WBC # BLD: 5.13 K/UL — SIGNIFICANT CHANGE UP (ref 3.8–10.5)
WBC # FLD AUTO: 5.13 K/UL — SIGNIFICANT CHANGE UP (ref 3.8–10.5)

## 2023-03-09 PROCEDURE — G0463: CPT

## 2023-03-09 PROCEDURE — 85027 COMPLETE CBC AUTOMATED: CPT

## 2023-03-09 PROCEDURE — 80048 BASIC METABOLIC PNL TOTAL CA: CPT

## 2023-03-09 NOTE — H&P PST ADULT - NSICDXPASTMEDICALHX_GEN_ALL_CORE_FT
PAST MEDICAL HISTORY:  Asthma with allergic rhinitis     GERD (gastroesophageal reflux disease)     S/p tibial fracture

## 2023-03-09 NOTE — H&P PST ADULT - DECREASED STRENGTH DETAIL
Right leg immobilizer in place , trace edema right ankle, +PP Right leg ace wrap intact, Right leg immobilizer in place , trace edema right ankle, +PP, limited ROM

## 2023-03-09 NOTE — H&P PST ADULT - PROBLEM SELECTOR PLAN 1
ORIF right tibia plateau fracture  Labs- CBC, CMP  Pre op instructions discussed, all questions answered

## 2023-03-09 NOTE — H&P PST ADULT - HISTORY OF PRESENT ILLNESS
56 yo M c/o twisted his knee and fell on 3/5/23. Pt had ED admission to [facility] s/p X-ray knee revealed displaced bicondylar fracture right tibia. Had orthopedic  consult- scheduled for open reduction internal fixation right tibia plateau fracture on 3/10/23  **Pt denies any fever, chills, CP/SOB or sick contacts 56 yo M c/o twisted his knee and fell on 3/5/23. Pt had ED admission to [facility] s/p X-ray knee revealed displaced bicondylar fracture right tibia. Had orthopedic  consult- scheduled for open reduction internal fixation right tibia plateau fracture on 3/10/23  **Pt denies any fever, chills, LOC, CP/SOB or sick contacts

## 2023-03-09 NOTE — H&P PST ADULT - NSANTHOSAYNRD_GEN_A_CORE
No. KATYA screening performed.  STOP BANG Legend: 0-2 = LOW Risk; 3-4 = INTERMEDIATE Risk; 5-8 = HIGH Risk

## 2023-03-09 NOTE — H&P PST ADULT - ATTENDING COMMENTS
Associated images, notes, and labs were reviewed. The patient was seen and examined. Risks and benefits of operative versus nonoperative management were discussed with the patient. The patient showed a good understanding of the injury and the treatment options. They would like to move forward with operative management of the tibial plateau fracture.

## 2023-03-10 ENCOUNTER — TRANSCRIPTION ENCOUNTER (OUTPATIENT)
Age: 58
End: 2023-03-10

## 2023-03-10 ENCOUNTER — APPOINTMENT (OUTPATIENT)
Dept: ORTHOPEDIC SURGERY | Facility: HOSPITAL | Age: 58
End: 2023-03-10

## 2023-03-10 ENCOUNTER — INPATIENT (INPATIENT)
Facility: HOSPITAL | Age: 58
LOS: 2 days | Discharge: HOME | DRG: 493 | End: 2023-03-13
Attending: GENERAL ACUTE CARE HOSPITAL | Admitting: GENERAL ACUTE CARE HOSPITAL
Payer: COMMERCIAL

## 2023-03-10 VITALS
OXYGEN SATURATION: 97 % | DIASTOLIC BLOOD PRESSURE: 68 MMHG | HEART RATE: 73 BPM | WEIGHT: 210.1 LBS | SYSTOLIC BLOOD PRESSURE: 109 MMHG | TEMPERATURE: 98 F | HEIGHT: 72 IN | RESPIRATION RATE: 18 BRPM

## 2023-03-10 DIAGNOSIS — S82.141A DISPLACED BICONDYLAR FRACTURE OF RIGHT TIBIA, INITIAL ENCOUNTER FOR CLOSED FRACTURE: ICD-10-CM

## 2023-03-10 DIAGNOSIS — Z98.890 OTHER SPECIFIED POSTPROCEDURAL STATES: Chronic | ICD-10-CM

## 2023-03-10 LAB — GLUCOSE BLDC GLUCOMTR-MCNC: 112 MG/DL — HIGH (ref 70–99)

## 2023-03-10 PROCEDURE — 27535 TREAT KNEE FRACTURE: CPT | Mod: RT

## 2023-03-10 PROCEDURE — 93971 EXTREMITY STUDY: CPT | Mod: 26,RT

## 2023-03-10 DEVICE — IMPLANTABLE DEVICE: Type: IMPLANTABLE DEVICE | Status: FUNCTIONAL

## 2023-03-10 DEVICE — WAX MONTAGE 5CC STRL: Type: IMPLANTABLE DEVICE | Status: FUNCTIONAL

## 2023-03-10 DEVICE — SCREW CTX EVOS S-T 3.5X36MM: Type: IMPLANTABLE DEVICE | Status: FUNCTIONAL

## 2023-03-10 DEVICE — GUIDE PIN SHARP 1.6MM X 160MM: Type: IMPLANTABLE DEVICE | Status: FUNCTIONAL

## 2023-03-10 DEVICE — SCREW CTX EVOS S-T 3.5X70MM: Type: IMPLANTABLE DEVICE | Status: FUNCTIONAL

## 2023-03-10 DEVICE — SCREW CTX EVOS S-T 3.5X38MM: Type: IMPLANTABLE DEVICE | Status: FUNCTIONAL

## 2023-03-10 RX ORDER — OMEPRAZOLE 10 MG/1
1 CAPSULE, DELAYED RELEASE ORAL
Qty: 0 | Refills: 0 | DISCHARGE

## 2023-03-10 RX ORDER — APIXABAN 2.5 MG/1
5 TABLET, FILM COATED ORAL
Refills: 0 | Status: DISCONTINUED | OUTPATIENT
Start: 2023-03-10 | End: 2023-03-13

## 2023-03-10 RX ORDER — CHLORHEXIDINE GLUCONATE 213 G/1000ML
1 SOLUTION TOPICAL ONCE
Refills: 0 | Status: DISCONTINUED | OUTPATIENT
Start: 2023-03-10 | End: 2023-03-10

## 2023-03-10 RX ORDER — HYDROMORPHONE HYDROCHLORIDE 2 MG/ML
1 INJECTION INTRAMUSCULAR; INTRAVENOUS; SUBCUTANEOUS
Refills: 0 | Status: DISCONTINUED | OUTPATIENT
Start: 2023-03-10 | End: 2023-03-10

## 2023-03-10 RX ORDER — SODIUM CHLORIDE 9 MG/ML
1000 INJECTION, SOLUTION INTRAVENOUS
Refills: 0 | Status: DISCONTINUED | OUTPATIENT
Start: 2023-03-10 | End: 2023-03-10

## 2023-03-10 RX ORDER — ACETAMINOPHEN 500 MG
1000 TABLET ORAL ONCE
Refills: 0 | Status: COMPLETED | OUTPATIENT
Start: 2023-03-10 | End: 2023-03-10

## 2023-03-10 RX ORDER — CEFAZOLIN SODIUM 1 G
2000 VIAL (EA) INJECTION EVERY 8 HOURS
Refills: 0 | Status: COMPLETED | OUTPATIENT
Start: 2023-03-10 | End: 2023-03-11

## 2023-03-10 RX ORDER — CYCLOBENZAPRINE HYDROCHLORIDE 10 MG/1
10 TABLET, FILM COATED ORAL ONCE
Refills: 0 | Status: COMPLETED | OUTPATIENT
Start: 2023-03-10 | End: 2023-03-10

## 2023-03-10 RX ORDER — CYCLOBENZAPRINE HYDROCHLORIDE 10 MG/1
1 TABLET, FILM COATED ORAL
Qty: 21 | Refills: 0
Start: 2023-03-10 | End: 2023-03-16

## 2023-03-10 RX ORDER — FENTANYL CITRATE 50 UG/ML
25 INJECTION INTRAVENOUS
Refills: 0 | Status: DISCONTINUED | OUTPATIENT
Start: 2023-03-10 | End: 2023-03-10

## 2023-03-10 RX ORDER — OXYCODONE HYDROCHLORIDE 5 MG/1
1 TABLET ORAL
Qty: 30 | Refills: 0
Start: 2023-03-10 | End: 2023-03-16

## 2023-03-10 RX ORDER — LIDOCAINE HCL 20 MG/ML
0.2 VIAL (ML) INJECTION ONCE
Refills: 0 | Status: DISCONTINUED | OUTPATIENT
Start: 2023-03-10 | End: 2023-03-10

## 2023-03-10 RX ORDER — ONDANSETRON 8 MG/1
4 TABLET, FILM COATED ORAL ONCE
Refills: 0 | Status: DISCONTINUED | OUTPATIENT
Start: 2023-03-10 | End: 2023-03-10

## 2023-03-10 RX ORDER — KETOROLAC TROMETHAMINE 30 MG/ML
15 SYRINGE (ML) INJECTION ONCE
Refills: 0 | Status: DISCONTINUED | OUTPATIENT
Start: 2023-03-10 | End: 2023-03-10

## 2023-03-10 RX ORDER — CEFAZOLIN SODIUM 1 G
2000 VIAL (EA) INJECTION ONCE
Refills: 0 | Status: COMPLETED | OUTPATIENT
Start: 2023-03-10 | End: 2023-03-10

## 2023-03-10 RX ORDER — PANTOPRAZOLE SODIUM 20 MG/1
40 TABLET, DELAYED RELEASE ORAL
Refills: 0 | Status: DISCONTINUED | OUTPATIENT
Start: 2023-03-10 | End: 2023-03-13

## 2023-03-10 RX ORDER — ASPIRIN/CALCIUM CARB/MAGNESIUM 324 MG
1 TABLET ORAL
Qty: 60 | Refills: 0
Start: 2023-03-10 | End: 2023-04-08

## 2023-03-10 RX ORDER — SODIUM CHLORIDE 9 MG/ML
3 INJECTION INTRAMUSCULAR; INTRAVENOUS; SUBCUTANEOUS EVERY 8 HOURS
Refills: 0 | Status: DISCONTINUED | OUTPATIENT
Start: 2023-03-10 | End: 2023-03-10

## 2023-03-10 RX ADMIN — Medication 15 MILLIGRAM(S): at 14:30

## 2023-03-10 RX ADMIN — Medication 15 MILLIGRAM(S): at 14:08

## 2023-03-10 RX ADMIN — Medication 400 MILLIGRAM(S): at 11:21

## 2023-03-10 RX ADMIN — APIXABAN 5 MILLIGRAM(S): 2.5 TABLET, FILM COATED ORAL at 17:40

## 2023-03-10 RX ADMIN — CYCLOBENZAPRINE HYDROCHLORIDE 10 MILLIGRAM(S): 10 TABLET, FILM COATED ORAL at 12:40

## 2023-03-10 RX ADMIN — Medication 1000 MILLIGRAM(S): at 12:00

## 2023-03-10 RX ADMIN — Medication 100 MILLIGRAM(S): at 17:54

## 2023-03-10 RX ADMIN — SODIUM CHLORIDE 75 MILLILITER(S): 9 INJECTION, SOLUTION INTRAVENOUS at 12:40

## 2023-03-10 NOTE — PATIENT PROFILE ADULT - FALL HARM RISK - UNIVERSAL INTERVENTIONS
Bed in lowest position, wheels locked, appropriate side rails in place/Call bell, personal items and telephone in reach/Instruct patient to call for assistance before getting out of bed or chair/Non-slip footwear when patient is out of bed/[place] to call system/Physically safe environment - no spills, clutter or unnecessary equipment/Purposeful Proactive Rounding/Room/bathroom lighting operational, light cord in reach

## 2023-03-10 NOTE — PRE-ANESTHESIA EVALUATION ADULT - LAST STRESS TEST
2015 Tissue Cultured Epidermal Autograft Text: The defect edges were debeveled with a #15 scalpel blade.  Given the location of the defect, shape of the defect and the proximity to free margins a tissue cultured epidermal autograft was deemed most appropriate.  The graft was then trimmed to fit the size of the defect.  The graft was then placed in the primary defect and oriented appropriately.

## 2023-03-10 NOTE — ASU PATIENT PROFILE, ADULT - FALL HARM RISK - HARM RISK INTERVENTIONS

## 2023-03-10 NOTE — BRIEF OPERATIVE NOTE - NSICDXBRIEFPROCEDURE_GEN_ALL_CORE_FT
PROCEDURES:  Open reduction and internal fixation (ORIF) of fracture of tibial plateau with C-arm fluoroscopic guidance 10-Mar-2023 10:09:07  Merrick Ji

## 2023-03-10 NOTE — ASU DISCHARGE PLAN (ADULT/PEDIATRIC) - CARE PROVIDER_API CALL
Steven Arauz)  Orthopedics  [address] [place] [address]  Phone: [phone]  Fax: [phone]  Follow Up Time: 2 weeks

## 2023-03-10 NOTE — CHART NOTE - NSCHARTNOTEFT_GEN_A_CORE
Resting bedside in PACU, c/o R calf pain and tenderness.  No Chest Pain, SOB, N/V.    T(C): 37.1 (03-10-23 @ 17:00), Max: 37.4 (03-10-23 @ 11:00)  HR: 67 (03-10-23 @ 19:00) (67 - 93)  BP: 90/54 (03-10-23 @ 19:00) (90/54 - 110/58)  RR: 16 (03-10-23 @ 19:00) (16 - 20)  SpO2: 94% (03-10-23 @ 19:00) (92% - 100%)  Wt(kg): --    Exam:  Alert and Rhodelia, No Acute Distress  Card: +S1/S2, RRR  Pulm: CTAB  Laterality: R Knee  Soft dressing with Thayer Knee brace unlocked  R Calves soft, tender with palpation, non swollen  +PF/DF/EHL/FHL  SILT  + DP pulse    Xray: Intra-Op Fluoroscopy: S/P R Tibial plateau Fx ORIF, hardware in place and intact with no signs of new Fx.                          13.6   5.13  )-----------( 188      ( 09 Mar 2023 18:41 )             40.6    03-09    140  |  103  |  20  ----------------------------<  100<H>  4.2   |  23  |  0.89    Ca    9.2      09 Mar 2023 18:41        A/P: 57y Male S/p R Tibial plateau Fx ORIF. VSS. NAD  -PT/OT-NWB RLE in jonathan knee immobilizer unlocked  -FU Medicine consult for DVT management  -IS  -DVT PPx: Patient with positive DVT in R Calf, As per medicine team, patient started on Eliquis 5mg PO BID tonight.  -Pain Control  -Continue Current Tx  -Dispo planning PACU to Floor    Tom Constantino PA-C  Orthopedic Surgery Team  Team Pager #[phone]

## 2023-03-10 NOTE — ASU DISCHARGE PLAN (ADULT/PEDIATRIC) - ASU DC SPECIAL INSTRUCTIONSFT
Follow up with Dr Arauz in 2 weeks. Call office for appointment. Take medications as prescribed. Keep dressing clean, dry, and intact. Rest, ice, and elevate affected extremity.    No weight bearing on right lower extremity, remain in brace at all times. May perform knee range of motion as tolerated. Use crutches for ambulation.

## 2023-03-10 NOTE — ASU DISCHARGE PLAN (ADULT/PEDIATRIC) - PROCEDURE
Right tibial pleateau open reduction internal fixation Right tibial plateau open reduction internal fixation

## 2023-03-10 NOTE — ASU DISCHARGE PLAN (ADULT/PEDIATRIC) - NS MD DC FALL RISK RISK
For information on Fall & Injury Prevention, visit: [email or web address] OR  [email or web address] OR  https://www.cdc.gov/steadi/patient.html

## 2023-03-11 DIAGNOSIS — M79.604 PAIN IN RIGHT LEG: ICD-10-CM

## 2023-03-11 DIAGNOSIS — S82.141A DISPLACED BICONDYLAR FRACTURE OF RIGHT TIBIA, INITIAL ENCOUNTER FOR CLOSED FRACTURE: ICD-10-CM

## 2023-03-11 DIAGNOSIS — K21.9 GASTRO-ESOPHAGEAL REFLUX DISEASE WITHOUT ESOPHAGITIS: ICD-10-CM

## 2023-03-11 DIAGNOSIS — I82.4Z1 ACUTE EMBOLISM AND THROMBOSIS OF UNSPECIFIED DEEP VEINS OF RIGHT DISTAL LOWER EXTREMITY: ICD-10-CM

## 2023-03-11 LAB
ANION GAP SERPL CALC-SCNC: 12 MMOL/L — SIGNIFICANT CHANGE UP (ref 5–17)
BUN SERPL-MCNC: 16 MG/DL — SIGNIFICANT CHANGE UP (ref 7–23)
CALCIUM SERPL-MCNC: 8.9 MG/DL — SIGNIFICANT CHANGE UP (ref 8.4–10.5)
CHLORIDE SERPL-SCNC: 103 MMOL/L — SIGNIFICANT CHANGE UP (ref 96–108)
CO2 SERPL-SCNC: 23 MMOL/L — SIGNIFICANT CHANGE UP (ref 22–31)
CREAT SERPL-MCNC: 0.84 MG/DL — SIGNIFICANT CHANGE UP (ref 0.5–1.3)
EGFR: 103 ML/MIN/1.73M2 — SIGNIFICANT CHANGE UP
GLUCOSE SERPL-MCNC: 106 MG/DL — HIGH (ref 70–99)
HCT VFR BLD CALC: 33.6 % — LOW (ref 39–50)
HGB BLD-MCNC: 11.3 G/DL — LOW (ref 13–17)
MCHC RBC-ENTMCNC: 30.8 PG — SIGNIFICANT CHANGE UP (ref 27–34)
MCHC RBC-ENTMCNC: 33.6 GM/DL — SIGNIFICANT CHANGE UP (ref 32–36)
MCV RBC AUTO: 91.6 FL — SIGNIFICANT CHANGE UP (ref 80–100)
NRBC # BLD: 0 /100 WBCS — SIGNIFICANT CHANGE UP (ref 0–0)
PLATELET # BLD AUTO: 167 K/UL — SIGNIFICANT CHANGE UP (ref 150–400)
POTASSIUM SERPL-MCNC: 4 MMOL/L — SIGNIFICANT CHANGE UP (ref 3.5–5.3)
POTASSIUM SERPL-SCNC: 4 MMOL/L — SIGNIFICANT CHANGE UP (ref 3.5–5.3)
RBC # BLD: 3.67 M/UL — LOW (ref 4.2–5.8)
RBC # FLD: 12.6 % — SIGNIFICANT CHANGE UP (ref 10.3–14.5)
SODIUM SERPL-SCNC: 138 MMOL/L — SIGNIFICANT CHANGE UP (ref 135–145)
WBC # BLD: 7.68 K/UL — SIGNIFICANT CHANGE UP (ref 3.8–10.5)
WBC # FLD AUTO: 7.68 K/UL — SIGNIFICANT CHANGE UP (ref 3.8–10.5)

## 2023-03-11 RX ORDER — HYDROMORPHONE HYDROCHLORIDE 2 MG/ML
0.5 INJECTION INTRAMUSCULAR; INTRAVENOUS; SUBCUTANEOUS
Refills: 0 | Status: DISCONTINUED | OUTPATIENT
Start: 2023-03-11 | End: 2023-03-11

## 2023-03-11 RX ORDER — SODIUM CHLORIDE 9 MG/ML
500 INJECTION INTRAMUSCULAR; INTRAVENOUS; SUBCUTANEOUS ONCE
Refills: 0 | Status: COMPLETED | OUTPATIENT
Start: 2023-03-11 | End: 2023-03-11

## 2023-03-11 RX ORDER — OXYCODONE HYDROCHLORIDE 5 MG/1
5 TABLET ORAL EVERY 4 HOURS
Refills: 0 | Status: DISCONTINUED | OUTPATIENT
Start: 2023-03-11 | End: 2023-03-13

## 2023-03-11 RX ORDER — OXYCODONE HYDROCHLORIDE 5 MG/1
10 TABLET ORAL EVERY 4 HOURS
Refills: 0 | Status: DISCONTINUED | OUTPATIENT
Start: 2023-03-11 | End: 2023-03-13

## 2023-03-11 RX ORDER — ACETAMINOPHEN 500 MG
1000 TABLET ORAL ONCE
Refills: 0 | Status: COMPLETED | OUTPATIENT
Start: 2023-03-11 | End: 2023-03-11

## 2023-03-11 RX ORDER — ACETAMINOPHEN 500 MG
650 TABLET ORAL EVERY 6 HOURS
Refills: 0 | Status: DISCONTINUED | OUTPATIENT
Start: 2023-03-11 | End: 2023-03-12

## 2023-03-11 RX ADMIN — OXYCODONE HYDROCHLORIDE 5 MILLIGRAM(S): 5 TABLET ORAL at 13:31

## 2023-03-11 RX ADMIN — Medication 400 MILLIGRAM(S): at 21:22

## 2023-03-11 RX ADMIN — APIXABAN 5 MILLIGRAM(S): 2.5 TABLET, FILM COATED ORAL at 06:21

## 2023-03-11 RX ADMIN — Medication 650 MILLIGRAM(S): at 16:45

## 2023-03-11 RX ADMIN — HYDROMORPHONE HYDROCHLORIDE 0.5 MILLIGRAM(S): 2 INJECTION INTRAMUSCULAR; INTRAVENOUS; SUBCUTANEOUS at 06:21

## 2023-03-11 RX ADMIN — APIXABAN 5 MILLIGRAM(S): 2.5 TABLET, FILM COATED ORAL at 17:25

## 2023-03-11 RX ADMIN — OXYCODONE HYDROCHLORIDE 5 MILLIGRAM(S): 5 TABLET ORAL at 14:40

## 2023-03-11 RX ADMIN — Medication 400 MILLIGRAM(S): at 09:36

## 2023-03-11 RX ADMIN — HYDROMORPHONE HYDROCHLORIDE 0.5 MILLIGRAM(S): 2 INJECTION INTRAMUSCULAR; INTRAVENOUS; SUBCUTANEOUS at 07:36

## 2023-03-11 RX ADMIN — PANTOPRAZOLE SODIUM 40 MILLIGRAM(S): 20 TABLET, DELAYED RELEASE ORAL at 06:22

## 2023-03-11 RX ADMIN — SODIUM CHLORIDE 500 MILLILITER(S): 9 INJECTION INTRAMUSCULAR; INTRAVENOUS; SUBCUTANEOUS at 10:18

## 2023-03-11 RX ADMIN — Medication 1000 MILLIGRAM(S): at 10:40

## 2023-03-11 RX ADMIN — Medication 650 MILLIGRAM(S): at 15:40

## 2023-03-11 RX ADMIN — Medication 1000 MILLIGRAM(S): at 21:55

## 2023-03-11 RX ADMIN — Medication 100 MILLIGRAM(S): at 00:39

## 2023-03-11 NOTE — PHYSICAL THERAPY INITIAL EVALUATION ADULT - PERTINENT HX OF CURRENT PROBLEM, REHAB EVAL
56 yo M c/o twisted his knee and fell on 3/5/23. Pt had ED admission to [facility] s/p X-ray knee revealed displaced bicondylar fracture right tibia. Had orthopedic  consult- scheduled for open reduction internal fixation right tibia plateau fracture on 3/10/23. Patient with positive DVT in R Calf

## 2023-03-11 NOTE — PHYSICAL THERAPY INITIAL EVALUATION ADULT - NSPTDISCHREC_GEN_A_CORE
TBD pending functional eval Assist from family for stair bumping and functional mobility/ADLs as needed/Outpatient PT

## 2023-03-11 NOTE — CONSULT NOTE ADULT - CONSULT REQUESTED DATE/TIME
[de-identified] : XR left forearm - significant interval healing of both bone forearm fracture when compared to previous XRs. Alignment remains acceptable. Fracture line no longer visible 
11-Mar-2023 14:18

## 2023-03-11 NOTE — PHYSICAL THERAPY INITIAL EVALUATION ADULT - ADDITIONAL COMMENTS
Per pt, he lives in a house with wife. Has 8 steps to enter (+HR) and 5+4 steps inside (+HR). Reports being independent with functional mobility/ADLs without AD. +drive. Per pt, he lives in a house with wife. Dtrs will be home this week and can assist as needed. Has 8 steps to enter (+HR) and 5+4 steps inside (+HR). Reports being independent with functional mobility/ADLs without AD. +drive.

## 2023-03-11 NOTE — PHYSICAL THERAPY INITIAL EVALUATION ADULT - ACTIVE RANGE OF MOTION EXAMINATION, REHAB EVAL
RLE unable to assess 2/2 knee immobilizer and pt complaining of pain with movement/bilateral upper extremity Active ROM was WFL (within functional limits)/Left LE Active ROM was WFL (within functional limits)

## 2023-03-11 NOTE — CONSULT NOTE ADULT - SUBJECTIVE AND OBJECTIVE BOX
Patient is a 56 yo male who present to [facility] after a fall in a parking lot. Patient was seen by Dr Arauz and was found to have a tibial plateau frature. Patient was scheduled for surgery 3/10. Pts post op course was complicated by right calf pain. Patient had LE dopplers which showed ccute below the knee DVT involving the posterior tibial, peroneal and soleal veins of the right calf. Patient seen now resting comfortably. Patient states he continue to have pain in the right calf. Patient post op course has been otherwise unremakable. Patient seen now with his wife and daughter at bedside.      PAST MEDICAL & SURGICAL HISTORY:  GERD (gastroesophageal reflux disease)      Asthma with allergic rhinitis      S/p tibial fracture      S/P nasal septoplasty      H/O arthroscopy of right knee            MEDICATIONS  (STANDING):  apixaban 5 milliGRAM(s) Oral two times a day  pantoprazole    Tablet 40 milliGRAM(s) Oral before breakfast    MEDICATIONS  (PRN):  acetaminophen     Tablet .. 650 milliGRAM(s) Oral every 6 hours PRN Mild Pain (1 - 3)  oxyCODONE    IR 5 milliGRAM(s) Oral every 4 hours PRN Moderate Pain (4 - 6)  oxyCODONE    IR 10 milliGRAM(s) Oral every 4 hours PRN Severe Pain (7 - 10)    Social Hx:  Tobacco: neg  ETOH:neg  Drugs:  neg    Family Hx  CAD  Breast Ca    ROS  CONSTITUTIONAL: No weakness, fevers or chills  EYES/ENT: No visual changes;  No vertigo or throat pain   NECK: No pain or stiffness  RESPIRATORY: No cough, wheezing, hemoptysis; No shortness of breath  CARDIOVASCULAR: No chest pain or palpitations  GASTROINTESTINAL: No abdominal or epigastric pain. No nausea, vomiting, or hematemesis; No diarrhea or constipation. No melena or hematochezia.  GENITOURINARY: No dysuria, frequency or hematuria  NEUROLOGICAL: No numbness or weakness  SKIN: No itching, burning, rashes, or lesions   MUSCULOSKELETAL: right LE pain    INTERVAL HPI/OVERNIGHT EVENTS:  T(C): 36.6 (03-11-23 @ 08:02), Max: 37.2 (03-11-23 @ 04:42)  HR: 69 (03-11-23 @ 13:25) (65 - 80)  BP: 104/64 (03-11-23 @ 13:25) (81/51 - 105/60)  RR: 18 (03-11-23 @ 13:25) (16 - 20)  SpO2: 96% (03-11-23 @ 13:25) (93% - 100%)  Wt(kg): --  I&O's Summary    10 Mar 2023 07:01  -  11 Mar 2023 07:00  --------------------------------------------------------  IN: 1230 mL / OUT: 1800 mL / NET: -570 mL    11 Mar 2023 06:01  -  11 Mar 2023 14:24  --------------------------------------------------------  IN: 120 mL / OUT: 0 mL / NET: 120 mL        PHYSICAL EXAM:  GENERAL: NAD, well-groomed, well-developed  HEAD:  Atraumatic, Normocephalic  EYES: EOMI, PERRLA, conjunctiva and sclera clear  ENMT: No tonsillar erythema, exudates, or enlargement; Moist mucous membranes, Good dentition, No lesions  NECK: Supple, No JVD, Normal thyroid  NERVOUS SYSTEM:  Alert & Oriented X3, Good concentration; Motor Strength 5/5 B/L upper and lower extremities; DTRs 2+ intact and symmetric  CHEST/LUNG: Clear to percussion bilaterally; No rales, rhonchi, wheezing, or rubs  HEART: Regular rate and rhythm; No murmurs, rubs, or gallops  ABDOMEN: Soft, Nontender, Nondistended; Bowel sounds present  EXTREMITIES:  2+ Peripheral Pulses, No clubbing, cyanosis, or edema  LYMPH: No lymphadenopathy noted  SKIN: No rashes or lesions        LABS:                        11.3   7.68  )-----------( 167      ( 11 Mar 2023 06:53 )             33.6     03-11    138  |  103  |  16  ----------------------------<  106<H>  4.0   |  23  |  0.84    Ca    8.9      11 Mar 2023 06:53          CAPILLARY BLOOD GLUCOSE                Radiology reports:

## 2023-03-11 NOTE — PHYSICAL THERAPY INITIAL EVALUATION ADULT - PLANNED THERAPY INTERVENTIONS, PT EVAL
balance training/bed mobility training/gait training/strengthening/transfer training STAIR GOAL: Pt will negotiate 1 FOS via stair bumping independently within 3 weeks./balance training/bed mobility training/gait training/strengthening/transfer training

## 2023-03-11 NOTE — PHYSICAL THERAPY INITIAL EVALUATION ADULT - DIAGNOSIS, PT EVAL
Decreased functional mobility Pt presents with impairments in severe R LE pain, strength, balance and endurance impacting ability to perform ADLs and functional mobility

## 2023-03-11 NOTE — CONSULT NOTE ADULT - PROBLEM SELECTOR RECOMMENDATION 2
Patient started on eliquis 5mg BID  keep leg elevated  warm soaks to calf as needed  follow for any sign of bleeding
no

## 2023-03-11 NOTE — PHYSICAL THERAPY INITIAL EVALUATION ADULT - CRITERIA FOR SKILLED THERAPEUTIC INTERVENTIONS
impairments found/functional limitations in following categories/risk reduction/prevention/rehab potential/anticipated discharge recommendation impairments found/functional limitations in following categories/risk reduction/prevention/rehab potential/predicted duration of therapy intervention/anticipated equipment needs at discharge/anticipated discharge recommendation

## 2023-03-11 NOTE — CONSULT NOTE ADULT - PROBLEM SELECTOR RECOMMENDATION 9
Patient s/p an Open reduction and internal fixation of the right tibial plateau  Patient NWB on the RLE  PO as tolerated

## 2023-03-12 ENCOUNTER — TRANSCRIPTION ENCOUNTER (OUTPATIENT)
Age: 58
End: 2023-03-12

## 2023-03-12 LAB
ANION GAP SERPL CALC-SCNC: 10 MMOL/L — SIGNIFICANT CHANGE UP (ref 5–17)
BUN SERPL-MCNC: 18 MG/DL — SIGNIFICANT CHANGE UP (ref 7–23)
CALCIUM SERPL-MCNC: 8.9 MG/DL — SIGNIFICANT CHANGE UP (ref 8.4–10.5)
CHLORIDE SERPL-SCNC: 102 MMOL/L — SIGNIFICANT CHANGE UP (ref 96–108)
CO2 SERPL-SCNC: 25 MMOL/L — SIGNIFICANT CHANGE UP (ref 22–31)
CREAT SERPL-MCNC: 0.88 MG/DL — SIGNIFICANT CHANGE UP (ref 0.5–1.3)
EGFR: 102 ML/MIN/1.73M2 — SIGNIFICANT CHANGE UP
GLUCOSE SERPL-MCNC: 97 MG/DL — SIGNIFICANT CHANGE UP (ref 70–99)
HCT VFR BLD CALC: 33.1 % — LOW (ref 39–50)
HGB BLD-MCNC: 10.7 G/DL — LOW (ref 13–17)
MCHC RBC-ENTMCNC: 30.1 PG — SIGNIFICANT CHANGE UP (ref 27–34)
MCHC RBC-ENTMCNC: 32.3 GM/DL — SIGNIFICANT CHANGE UP (ref 32–36)
MCV RBC AUTO: 93.2 FL — SIGNIFICANT CHANGE UP (ref 80–100)
NRBC # BLD: 0 /100 WBCS — SIGNIFICANT CHANGE UP (ref 0–0)
PLATELET # BLD AUTO: 166 K/UL — SIGNIFICANT CHANGE UP (ref 150–400)
POTASSIUM SERPL-MCNC: 4.1 MMOL/L — SIGNIFICANT CHANGE UP (ref 3.5–5.3)
POTASSIUM SERPL-SCNC: 4.1 MMOL/L — SIGNIFICANT CHANGE UP (ref 3.5–5.3)
RBC # BLD: 3.55 M/UL — LOW (ref 4.2–5.8)
RBC # FLD: 12.4 % — SIGNIFICANT CHANGE UP (ref 10.3–14.5)
SODIUM SERPL-SCNC: 137 MMOL/L — SIGNIFICANT CHANGE UP (ref 135–145)
WBC # BLD: 7.17 K/UL — SIGNIFICANT CHANGE UP (ref 3.8–10.5)
WBC # FLD AUTO: 7.17 K/UL — SIGNIFICANT CHANGE UP (ref 3.8–10.5)

## 2023-03-12 RX ORDER — ACETAMINOPHEN 500 MG
1000 TABLET ORAL ONCE
Refills: 0 | Status: COMPLETED | OUTPATIENT
Start: 2023-03-12 | End: 2023-03-12

## 2023-03-12 RX ORDER — OXYCODONE HYDROCHLORIDE 5 MG/1
1 TABLET ORAL
Qty: 0 | Refills: 0 | DISCHARGE
Start: 2023-03-12

## 2023-03-12 RX ORDER — ACETAMINOPHEN 500 MG
2 TABLET ORAL
Qty: 0 | Refills: 0 | DISCHARGE
Start: 2023-03-12

## 2023-03-12 RX ORDER — APIXABAN 2.5 MG/1
1 TABLET, FILM COATED ORAL
Qty: 0 | Refills: 0 | DISCHARGE
Start: 2023-03-12

## 2023-03-12 RX ORDER — ACETAMINOPHEN 500 MG
1000 TABLET ORAL ONCE
Refills: 0 | Status: COMPLETED | OUTPATIENT
Start: 2023-03-13 | End: 2023-03-13

## 2023-03-12 RX ORDER — TRAMADOL HYDROCHLORIDE 50 MG/1
50 TABLET ORAL EVERY 6 HOURS
Refills: 0 | Status: DISCONTINUED | OUTPATIENT
Start: 2023-03-12 | End: 2023-03-13

## 2023-03-12 RX ORDER — ACETAMINOPHEN 500 MG
975 TABLET ORAL EVERY 8 HOURS
Refills: 0 | Status: DISCONTINUED | OUTPATIENT
Start: 2023-03-12 | End: 2023-03-13

## 2023-03-12 RX ADMIN — TRAMADOL HYDROCHLORIDE 50 MILLIGRAM(S): 50 TABLET ORAL at 12:50

## 2023-03-12 RX ADMIN — APIXABAN 5 MILLIGRAM(S): 2.5 TABLET, FILM COATED ORAL at 05:17

## 2023-03-12 RX ADMIN — Medication 400 MILLIGRAM(S): at 18:19

## 2023-03-12 RX ADMIN — Medication 1000 MILLIGRAM(S): at 11:15

## 2023-03-12 RX ADMIN — TRAMADOL HYDROCHLORIDE 50 MILLIGRAM(S): 50 TABLET ORAL at 11:40

## 2023-03-12 RX ADMIN — APIXABAN 5 MILLIGRAM(S): 2.5 TABLET, FILM COATED ORAL at 17:08

## 2023-03-12 RX ADMIN — PANTOPRAZOLE SODIUM 40 MILLIGRAM(S): 20 TABLET, DELAYED RELEASE ORAL at 05:17

## 2023-03-12 RX ADMIN — Medication 975 MILLIGRAM(S): at 14:40

## 2023-03-12 RX ADMIN — Medication 975 MILLIGRAM(S): at 13:31

## 2023-03-12 RX ADMIN — Medication 400 MILLIGRAM(S): at 10:12

## 2023-03-12 RX ADMIN — Medication 400 MILLIGRAM(S): at 04:14

## 2023-03-12 RX ADMIN — Medication 1000 MILLIGRAM(S): at 19:20

## 2023-03-12 RX ADMIN — Medication 1000 MILLIGRAM(S): at 05:02

## 2023-03-12 NOTE — DIETITIAN INITIAL EVALUATION ADULT - PERTINENT LABORATORY DATA
03-12    137  |  102  |  18  ----------------------------<  97  4.1   |  25  |  0.88    Ca    8.9      12 Mar 2023 06:38

## 2023-03-12 NOTE — DISCHARGE NOTE PROVIDER - HOSPITAL COURSE
History of Present Illness:  58 yo M c/o twisted his knee and fell on 3/5/23. Pt had ED admission to [facility] s/p X-ray knee revealed displaced bicondylar fracture right tibia. Had outpatient orthopedic consult with Dr. Arauz- scheduled for open reduction internal fixation right tibia plateau fracture on 3/10/23  **Pt denies any fever, chills, LOC, CP/SOB or sick contacts    PAST MEDICAL HISTORY:  Asthma with allergic rhinitis   GERD (gastroesophageal reflux disease)   S/p tibial fracture.     PAST SURGICAL HISTORY:  H/O arthroscopy of right knee   S/P nasal septoplasty.    Hospital Course:  After receiving pre-operative parenteral prophylactic antibiotics, the patient underwent an uncomplicated Right tibial plateau fx ORIF on 3/10 with Dr. Arauz.   Patient tolerated the procedure well and was transferred to the recovery room in stable condition, with a stable neuro / vascular exam of the operated extremity.  Venous Doppler ordered revealing Peroneal and soleal DVT. Patient evaluated by medicine and placed on Eliquis 5mg BID, with plan for treatment for 3-6 months.     Patient was made non weight bearing on the operative leg with use of crutches/walker.     Patient evaluated by PT/OT for crutch training, recommended for dispo to home.     Discharge and Orthopedic Care instructions were delineated in the Discharge Plan and reviewed with the patient. All medications were delineated in the medication reconciliation tool and key points were reviewed with the patient. They were deemed stable from an Orthopedic & medical standpoint for discharge. History of Present Illness:  58 yo M c/o twisted his knee and fell on 3/5/23. Pt had ED admission to [facility] s/p X-ray knee revealed displaced bicondylar fracture right tibia. Had outpatient orthopedic consult with Dr. Arauz- scheduled for open reduction internal fixation right tibia plateau fracture on 3/10/23  **Pt denies any fever, chills, LOC, CP/SOB or sick contacts    PAST MEDICAL HISTORY:  Asthma with allergic rhinitis   GERD (gastroesophageal reflux disease)   S/p tibial fracture.     PAST SURGICAL HISTORY:  H/O arthroscopy of right knee   S/P nasal septoplasty.    Hospital Course:  After receiving pre-operative parenteral prophylactic antibiotics, the patient underwent an uncomplicated Right tibial plateau fx ORIF on 3/10 with Dr. Arauz.   Patient tolerated the procedure well and was transferred to the recovery room in stable condition, with a stable neuro / vascular exam of the operated extremity.  Venous Doppler ordered revealing Peroneal and soleal DVT. Patient evaluated by medicine and placed on Eliquis 5mg BID, with plan for treatment for 3-6 months.     Patient was made non weight bearing on the operative leg with use of crutches/walker, with jonathan brace to the operative knee.     Patient evaluated by PT/OT for crutch training, recommended for dispo to home with outpatient PT once cleared to begin by Dr. Arauz.     Discharge and Orthopedic Care instructions were delineated in the Discharge Plan and reviewed with the patient. All medications were delineated in the medication reconciliation tool and key points were reviewed with the patient. They were deemed stable from an Orthopedic & medical standpoint for discharge. History of Present Illness:  58 yo M c/o twisted his knee and fell on 3/5/23. Pt had ED admission to [facility] s/p X-ray knee revealed displaced bicondylar fracture right tibia. Had outpatient orthopedic consult with Dr. Arauz- scheduled for open reduction internal fixation right tibia plateau fracture on 3/10/23  **Pt denies any fever, chills, LOC, CP/SOB or sick contacts    PAST MEDICAL HISTORY:  Asthma with allergic rhinitis   GERD (gastroesophageal reflux disease)   S/p tibial fracture.     PAST SURGICAL HISTORY:  H/O arthroscopy of right knee   S/P nasal septoplasty.    Hospital Course:  After receiving pre-operative parenteral prophylactic antibiotics, the patient underwent an uncomplicated Right tibial plateau fx ORIF on 3/10 with Dr. Arauz.   Patient tolerated the procedure well and was transferred to the recovery room in stable condition, with a stable neuro / vascular exam of the operated extremity.  Venous Doppler ordered revealing Peroneal and soleal DVT. Patient evaluated by medicine and placed on Eliquis 5mg BID, with plan for treatment for 3-6 months.     Patient was made non weight bearing on the operative leg with use of crutches/walker, with jonathan brace to the operative knee.     Dietary evaluation completed, no changes made to current diet.     Patient evaluated by PT/OT for crutch training, recommended for dispo to home with outpatient PT once cleared to begin by Dr. Arauz.     Discharge and Orthopedic Care instructions were delineated in the Discharge Plan and reviewed with the patient. All medications were delineated in the medication reconciliation tool and key points were reviewed with the patient. They were deemed stable from an Orthopedic & medical standpoint for discharge.

## 2023-03-12 NOTE — DISCHARGE NOTE PROVIDER - CARE PROVIDER_API CALL
Steven Arauz)  Orthopedics  [address] [place] [address]  Phone: [phone]  Fax: [phone]  Scheduled Appointment: 03/23/2023

## 2023-03-12 NOTE — DISCHARGE NOTE PROVIDER - NSDCMRMEDTOKEN_GEN_ALL_CORE_FT
acetaminophen 325 mg oral tablet: 2 tab(s) orally every 6 hours, As needed, Mild Pain (1 - 3)  apixaban 5 mg oral tablet: 1 tab(s) orally 2 times a day  omeprazole 20 mg oral delayed release tablet: 1 tab(s) orally once a day  oxyCODONE 5 mg oral tablet: 1 tab(s) orally every 4 hours, As needed, Moderate Pain (4 - 6)   acetaminophen 325 mg oral tablet: 2 tab(s) orally every 6 hours, As needed, Mild Pain (1 - 3)  apixaban 5 mg oral tablet: 1 tab(s) orally 2 times a day MDD:2  Narcan 4 mg/0.1 mL nasal spray: 1 spray(s) nasal every 2 minutes, As Needed alternate nostrils  omeprazole 20 mg oral delayed release tablet: 1 tab(s) orally once a day  oxyCODONE 5 mg oral tablet: 1 or 2  tab(s) orally every 4 hours, as needed for moderate to severe pain MDD:6  traMADol 50 mg oral tablet: 1 tab(s) orally every 6 hours, As needed, Mild Pain (1 - 3) MDD:4

## 2023-03-12 NOTE — PROGRESS NOTE ADULT - REASON FOR ADMISSION
Right tibial plateau fx s/p ORIF, admitted post operatively for management of DVT
Right tibial plateau fx, s/p ORIF

## 2023-03-12 NOTE — DISCHARGE NOTE PROVIDER - NSDCCPCAREPLAN_GEN_ALL_CORE_FT
PRINCIPAL DISCHARGE DIAGNOSIS  Diagnosis: Tibial plateau fracture, right  Assessment and Plan of Treatment: s/p ORIF right tibial plateau fx. Patient advised non weight bearing on the right lower extremity, with jonathan brace on at all times. Follow up recommended with Dr. Arauz in the office in 1-2 weeks for incision check, post operative exam.      SECONDARY DISCHARGE DIAGNOSES  Diagnosis: DVT, lower extremity, distal, acute, right  Assessment and Plan of Treatment: Patient placed on Eliquis 5mg BID x 3-6 months for DVT treatment.   Patient recommended for follow up with primary care physician for continued management.

## 2023-03-12 NOTE — DIETITIAN INITIAL EVALUATION ADULT - NSFNSNUTRHOMESUPPLEMENTFT_GEN_A_CORE
Took Vitamin C and Magnesium. Drinks protein shake every morning and eats protein bars throughout the day.

## 2023-03-12 NOTE — DIETITIAN INITIAL EVALUATION ADULT - PERTINENT MEDS FT
MEDICATIONS  (STANDING):  apixaban 5 milliGRAM(s) Oral two times a day  pantoprazole    Tablet 40 milliGRAM(s) Oral before breakfast    MEDICATIONS  (PRN):  acetaminophen     Tablet .. 650 milliGRAM(s) Oral every 6 hours PRN Mild Pain (1 - 3)  oxyCODONE    IR 5 milliGRAM(s) Oral every 4 hours PRN Moderate Pain (4 - 6)  oxyCODONE    IR 10 milliGRAM(s) Oral every 4 hours PRN Severe Pain (7 - 10)

## 2023-03-12 NOTE — DIETITIAN INITIAL EVALUATION ADULT - OTHER INFO
Wt Hx:   Dosing wt 95.3 kG/210.1 lbs.    lbs denies any changes in wt PTA.  Ht: 72 inches   IBW: 178 lbs    IBW%: 118%

## 2023-03-12 NOTE — DISCHARGE NOTE PROVIDER - NSDCFUADDINST_GEN_ALL_CORE_FT
Continue non weight bearing on the right lower extremity, with jonathan brace to the right knee at all times.   Continue on Eliquis 5mg twice daily for treatment of DVT.   Please follow up with your primary care physician regarding your hospitalization within one month of your discharge.   Continue non weight bearing on the right lower extremity, with jonathan brace to the right knee at all times.   Continue on Eliquis 5mg twice daily for treatment of DVT.   Keep dressing clean dry and intact.   Please follow up with your primary care physician regarding your hospitalization within one month of your discharge.

## 2023-03-12 NOTE — DISCHARGE NOTE PROVIDER - NSDCFUSCHEDAPPT_GEN_ALL_CORE_FT
[facility]  ORTHOSURG [address] [facility]  Scheduled Appointment: 03/23/2023

## 2023-03-12 NOTE — DIETITIAN INITIAL EVALUATION ADULT - ADD RECOMMEND
[Alert] : alert [No Acute Distress] : no acute distress [Normocephalic] : normocephalic [Flat Open Anterior [place]] : flat open anterior fontanelle [Red Reflex Bilateral] : red reflex bilateral [PERRL] : PERRL [Normally Placed Ears] : normally placed ears [Auricles Well Formed] : auricles well formed [Clear Tympanic membranes with present light reflex and bony landmarks] : clear tympanic membranes with present light reflex and bony landmarks [No Discharge] : no discharge [Nares Patent] : nares patent [Palate Intact] : palate intact [Tooth Eruption] : tooth eruption  [Uvula Midline] : uvula midline [Supple, full passive range of motion] : supple, full passive range of motion [No Palpable Masses] : no palpable masses Continue to trend labs, weight, skin integrity, and intake.  [Symmetric Chest Rise] : symmetric chest rise [Clear to Auscultation Bilaterally] : clear to auscultation bilaterally [Regular Rate and Rhythm] : regular rate and rhythm [S1, S2 present] : S1, S2 present [No Murmurs] : no murmurs [+2 Femoral Pulses] : +2 femoral pulses [Soft] : soft [NonTender] : non tender [Non Distended] : non distended [Normoactive Bowel Sounds] : normoactive bowel sounds [No Hepatomegaly] : no hepatomegaly [No Splenomegaly] : no splenomegaly [Josue 1] : Josue 1 [No Clitoromegaly] : no clitoromegaly [Normal Vaginal Introitus] : normal vaginal introitus [Patent] : patent [Normally Placed] : normally placed [No Abnormal Lymph Nodes Palpated] : no abnormal lymph nodes palpated [No Clavicular Crepitus] : no clavicular crepitus [Negative Martin-Ortalani] : negative Martin-Ortalani [Symmetric Buttocks Creases] : symmetric buttocks creases [No Spinal Dimple] : no spinal dimple [NoTuft of Hair] : no tuft of hair [Cranial Nerves Grossly Intact] : cranial nerves grossly intact [No Rash or Lesions] : no rash or lesions

## 2023-03-13 ENCOUNTER — TRANSCRIPTION ENCOUNTER (OUTPATIENT)
Age: 58
End: 2023-03-13

## 2023-03-13 VITALS
TEMPERATURE: 98 F | RESPIRATION RATE: 18 BRPM | DIASTOLIC BLOOD PRESSURE: 75 MMHG | HEART RATE: 71 BPM | OXYGEN SATURATION: 95 % | SYSTOLIC BLOOD PRESSURE: 119 MMHG

## 2023-03-13 PROCEDURE — 97116 GAIT TRAINING THERAPY: CPT

## 2023-03-13 PROCEDURE — 82962 GLUCOSE BLOOD TEST: CPT

## 2023-03-13 PROCEDURE — C1713: CPT

## 2023-03-13 PROCEDURE — 76000 FLUOROSCOPY <1 HR PHYS/QHP: CPT

## 2023-03-13 PROCEDURE — 80048 BASIC METABOLIC PNL TOTAL CA: CPT

## 2023-03-13 PROCEDURE — C1889: CPT

## 2023-03-13 PROCEDURE — 36415 COLL VENOUS BLD VENIPUNCTURE: CPT

## 2023-03-13 PROCEDURE — 86901 BLOOD TYPING SEROLOGIC RH(D): CPT

## 2023-03-13 PROCEDURE — 86900 BLOOD TYPING SEROLOGIC ABO: CPT

## 2023-03-13 PROCEDURE — 93971 EXTREMITY STUDY: CPT

## 2023-03-13 PROCEDURE — 97530 THERAPEUTIC ACTIVITIES: CPT

## 2023-03-13 PROCEDURE — 86850 RBC ANTIBODY SCREEN: CPT

## 2023-03-13 PROCEDURE — 85027 COMPLETE CBC AUTOMATED: CPT

## 2023-03-13 PROCEDURE — 97161 PT EVAL LOW COMPLEX 20 MIN: CPT

## 2023-03-13 RX ORDER — OXYCODONE HYDROCHLORIDE 5 MG/1
1 TABLET ORAL
Qty: 40 | Refills: 0
Start: 2023-03-13

## 2023-03-13 RX ORDER — NALOXONE HYDROCHLORIDE 4 MG/.1ML
1 SPRAY NASAL
Qty: 1 | Refills: 0
Start: 2023-03-13

## 2023-03-13 RX ORDER — TRAMADOL HYDROCHLORIDE 50 MG/1
1 TABLET ORAL
Qty: 28 | Refills: 0
Start: 2023-03-13 | End: 2023-03-19

## 2023-03-13 RX ORDER — APIXABAN 2.5 MG/1
1 TABLET, FILM COATED ORAL
Qty: 60 | Refills: 0
Start: 2023-03-13 | End: 2023-04-11

## 2023-03-13 RX ORDER — INFLUENZA VIRUS VACCINE 15; 15; 15; 15 UG/.5ML; UG/.5ML; UG/.5ML; UG/.5ML
0.5 SUSPENSION INTRAMUSCULAR ONCE
Refills: 0 | Status: DISCONTINUED | OUTPATIENT
Start: 2023-03-13 | End: 2023-03-13

## 2023-03-13 RX ADMIN — Medication 1000 MILLIGRAM(S): at 02:35

## 2023-03-13 RX ADMIN — Medication 400 MILLIGRAM(S): at 02:05

## 2023-03-13 RX ADMIN — TRAMADOL HYDROCHLORIDE 50 MILLIGRAM(S): 50 TABLET ORAL at 05:57

## 2023-03-13 RX ADMIN — TRAMADOL HYDROCHLORIDE 50 MILLIGRAM(S): 50 TABLET ORAL at 06:57

## 2023-03-13 RX ADMIN — APIXABAN 5 MILLIGRAM(S): 2.5 TABLET, FILM COATED ORAL at 05:57

## 2023-03-13 RX ADMIN — Medication 400 MILLIGRAM(S): at 09:59

## 2023-03-13 RX ADMIN — TRAMADOL HYDROCHLORIDE 50 MILLIGRAM(S): 50 TABLET ORAL at 01:00

## 2023-03-13 RX ADMIN — TRAMADOL HYDROCHLORIDE 50 MILLIGRAM(S): 50 TABLET ORAL at 00:00

## 2023-03-13 RX ADMIN — PANTOPRAZOLE SODIUM 40 MILLIGRAM(S): 20 TABLET, DELAYED RELEASE ORAL at 05:57

## 2023-03-13 NOTE — DISCHARGE NOTE NURSING/CASE MANAGEMENT/SOCIAL WORK - NSDCVIVACCINE_GEN_ALL_CORE_FT
Tdap; 05-Mar-2023 17:56; Cherrie Springer (RN); [employer]; [id number] (Exp. Date: 08-Dec-2024); IntraMuscular; Deltoid Right.; 0.5 milliLiter(s); VIS (VIS Published: 09-May-2013, VIS Presented: 05-Mar-2023);

## 2023-03-13 NOTE — PROGRESS NOTE ADULT - ASSESSMENT
A/P:  Pt is a 57 yr old male s/p right tibial plateau fx ORIF, + DVT RLE peroneal/soleal    - Pain control/ Analgesia  - DVT ppx/treatment - Eliquis   - PT/OT - NWB RLE in knee immobilizer.   - Incentive Spirometer  - GI prophylaxis: Protonix  - notify Ortho for questions   - pending medicine consult this AM  - Dispo planning to home      Karyn Noble PA-C  Orthopedic Surgery  Team Pager #[phone]
A/P:  Pt is a 57M s/p right tibial plateau fx ORIF, POD #3    - Pain control/ Analgesia  - Eliquis 5mg BID for DVT tx as per medicine  - PT/OT - NWB RLE in Thang brace  - Monitor BP/orthostatics  - Incentive Spirometer  - GI prophylaxis: Protonix  - notify Ortho for questions   - Dispo: Home pending pain control
A/P:  Pt is a 57M s/p right tibial plateau fx ORIF, POD #2  - Pain control/ Analgesia  - Eliquis 5mg BID for DVT tx as per medicine  - PT/OT - NWB RLE in [place] brace  - Monitor BP/orthostatics  - Incentive Spirometer  - GI prophylaxis: Protonix  - notify Ortho for questions   - Dispo: Pending PT/OT recs.    Karyn Noble PA-C  Orthopedic Surgery  Team Pager #[phone]
58 yo male s/p right tibial plateau fracture found to have DVTs of the RLE post op 
58 yo male s/p right tibial plateau fracture found to have DVTs of the RLE post op

## 2023-03-13 NOTE — DISCHARGE NOTE NURSING/CASE MANAGEMENT/SOCIAL WORK - PATIENT PORTAL LINK FT
You can access the FollowMyHealth Patient Portal offered by [facility] by registering at the following website: [email or web address]. By joining [employer]’s FollowMyHealth portal, you will also be able to view your health information using other applications (apps) compatible with our system.

## 2023-03-13 NOTE — DISCHARGE NOTE NURSING/CASE MANAGEMENT/SOCIAL WORK - NSDCPEFALRISK_GEN_ALL_CORE
For information on Fall & Injury Prevention, visit: [email or web address] OR  [email or web address] OR  https://www.cdc.gov/steadi/patient.html

## 2023-03-13 NOTE — PROGRESS NOTE ADULT - PROBLEM SELECTOR PLAN 4
continue pain meds as needed  follow for oversedation  GI regime to prevent constipation.
continue pain meds as needed  follow for oversedation  GI regime to prevent constipation.

## 2023-03-13 NOTE — PROGRESS NOTE ADULT - PROBLEM SELECTOR PLAN 1
Patient s/p an Open reduction and internal fixation of the right tibial plateau  Patient NWB on the RLE  PO as tolerated.  continue to work with physical therapy   wound care as per ortho
Patient s/p an Open reduction and internal fixation of the right tibial plateau  Patient NWB on the RLE  PO as tolerated.  continue to work with physical therapy   wound care as per ortho

## 2023-03-13 NOTE — PROGRESS NOTE ADULT - TIME BILLING
Discussed treatment plan with patient at bedside.
Discussed treatment plan with patient and daughters at bedside.

## 2023-03-13 NOTE — PROGRESS NOTE ADULT - PROBLEM SELECTOR PLAN 3
Continue PPI daily  keep HOB elevated while eating.
Continue PPI daily  keep HOB elevated while eating.

## 2023-03-23 ENCOUNTER — APPOINTMENT (OUTPATIENT)
Dept: ORTHOPEDIC SURGERY | Facility: CLINIC | Age: 58
End: 2023-03-23
Payer: COMMERCIAL

## 2023-03-23 PROBLEM — K21.9 GASTRO-ESOPHAGEAL REFLUX DISEASE WITHOUT ESOPHAGITIS: Chronic | Status: ACTIVE | Noted: 2023-03-09

## 2023-03-23 PROBLEM — Z87.81 PERSONAL HISTORY OF (HEALED) TRAUMATIC FRACTURE: Chronic | Status: ACTIVE | Noted: 2023-03-09

## 2023-03-23 PROBLEM — J45.909 UNSPECIFIED ASTHMA, UNCOMPLICATED: Chronic | Status: ACTIVE | Noted: 2023-03-09

## 2023-03-23 PROCEDURE — 99024 POSTOP FOLLOW-UP VISIT: CPT

## 2023-03-23 RX ORDER — OXYCODONE 5 MG/1
5 TABLET ORAL
Qty: 40 | Refills: 0 | Status: ACTIVE | COMMUNITY
Start: 2023-03-23 | End: 1900-01-01

## 2023-03-23 RX ORDER — LORAZEPAM 0.5 MG/1
0.5 TABLET ORAL
Qty: 14 | Refills: 0 | Status: ACTIVE | COMMUNITY
Start: 2023-03-23 | End: 1900-01-01

## 2023-03-31 RX ORDER — OXYCODONE 5 MG/1
5 TABLET ORAL EVERY 6 HOURS
Qty: 20 | Refills: 0 | Status: ACTIVE | COMMUNITY
Start: 2023-03-31 | End: 1900-01-01

## 2023-04-19 NOTE — DISCUSSION/SUMMARY
[de-identified] : 57-year-old gentleman with right tibial plateau fracture, approximately 3 days out.\par \par -The risks and benefits of operative versus nonoperative management were discussed at length with the patient. The patient shows a good understanding of the injury and treatment options. They would like to move forward with operative management. \par -Nonweightbearing right LE with crutches and [place]\par -Book surgery for Friday 3/10/23\par -Provide [place]\par -Follow-up in 2 weeks for post op.\par -All of the patient's questions and concerns were addressed.\par

## 2023-04-19 NOTE — DISCUSSION/SUMMARY
[de-identified] : 57-year-old gentleman with significantly displaced right lateral tibial plateau fracture, approximately 3 days out.\par \par -The risks and benefits of operative versus nonoperative management were discussed at length with the patient. The patient shows a good understanding of the injury and treatment options. They would like to move forward with operative management. \par -Nonweightbearing right LE with crutches and [place] provided to him today.\par -Schedule surgery for Friday Match 10th, 2023\par -Spring locked in extension with ACE wrap\par -Follow-up in 2 weeks for post op.\par -All of the patient's questions and concerns were addressed.\par  \par

## 2023-04-19 NOTE — PHYSICAL EXAM
[de-identified] : The patient is sitting comfortably in the exam room. \par RIGHT leg.\par -Skin is intact, no swelling, no ecchymosis\par -Range of motion\par -Negative Lachman, negative anterior drawer, negative posterior drawer\par -Negative Bam\par -Sensation is intact L1-S1\par -5/5 EHL, FHL, TA, GS, quadriceps, hamstrings\par -Foot is warm and well-perfused, palpable dorsalis pedis pulse\par  [de-identified] : \par ACC: [id number] EXAM: CT KNEE ONLY RT ORDERED BY: ELMIRA JAMES\par \par PROCEDURE DATE: 03/05/2023\par \par \par \par INTERPRETATION: HISTORY: Tibial plateau fracture.\par \par Helical CT imaging of the right knee was performed without intravenous contrast. Sagittal and coronal reformats were provided. 3-D reformats were performed on a separate workstation.\par \par Correlation is made with radiographs from the same day.\par \par FINDINGS: There is an acute markedly impacted fracture of the lateral tibial plateau. Area of fracture spans a transverse dimension of 2.8 cm and an anteroposterior dimension of 2.3 cm. The articular surface is impacted by approximately 2.8 cm. Nondisplaced fracture lucency extends along the lateral cortex of the tibia to the level of the tibial tuberosity.\par \par There is an additional avulsion fracture along the inferomedial aspect of the patella with the avulsed fragment measuring approximately 0.7 cm in length. This is nondisplaced.\par \par There is a small bone island within the lateral femoral condyle. There is slight lateral subluxation of the tibia relative to the femur.\par \par There is a large lipohemarthrosis. There is soft tissue swelling throughout the knee.\par \par IMPRESSION:\par \par Impacted acute lateral tibial plateau fracture with depression of the articular surface by up to 2.8 cm.\par \par Essentially nondisplaced curvilinear avulsion along the inferomedial aspect of the patella.\par \par Large lipohemarthrosis.\par \par --- End of Report ---\par \par \par \par \par \par FLORIN HIGGINBOTHAM MD; Attending Radiologist\par This document has been electronically signed. Mar 6 2023 8:24AM show

## 2023-04-19 NOTE — PHYSICAL EXAM
[de-identified] : ACC: [id number] EXAM: CT KNEE ONLY RT ORDERED BY: ELMIRA JAMES\par \par PROCEDURE DATE: 03/05/2023\par \par \par \par INTERPRETATION: HISTORY: Tibial plateau fracture.\par \par Helical CT imaging of the right knee was performed without intravenous contrast. Sagittal and coronal reformats were provided. 3-D reformats were performed on a separate workstation.\par \par Correlation is made with radiographs from the same day.\par \par FINDINGS: There is an acute markedly impacted fracture of the lateral tibial plateau. Area of fracture spans a transverse dimension of 2.8 cm and an anteroposterior dimension of 2.3 cm. The articular surface is impacted by approximately 2.8 cm. Nondisplaced fracture lucency extends along the lateral cortex of the tibia to the level of the tibial tuberosity.\par \par There is an additional avulsion fracture along the inferomedial aspect of the patella with the avulsed fragment measuring approximately 0.7 cm in length. This is nondisplaced.\par \par There is a small bone island within the lateral femoral condyle. There is slight lateral subluxation of the tibia relative to the femur.\par \par There is a large lipohemarthrosis. There is soft tissue swelling throughout the knee.\par \par IMPRESSION:\par \par Impacted acute lateral tibial plateau fracture with depression of the articular surface by up to 2.8 cm.\par \par Essentially nondisplaced curvilinear avulsion along the inferomedial aspect of the patella.\par \par Large lipohemarthrosis.\par \par --- End of Report ---\par \par \par \par \par \par FLORIN HIGGINBOTHAM MD; Attending Radiologist\par This document has been electronically signed. Mar 6 2023 8:24AM. \par \par  [de-identified] : The patient is sitting comfortably in the exam room. \par RIGHT leg.\par -Skin is intact, swelling, ecchymosis\par -Compartments are soft no pain with passive and active flexion extension of toes and ankle\par -Range of motion limited due to pain\par -Unstable to valgus stress\par -Sensation is intact L1-S1\par -Firing EHL, FHL, TA, GS, quadriceps, hamstrings\par -Foot is warm and well-perfused, palpable dorsalis pedis pulse\par
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
## 2023-04-27 ENCOUNTER — APPOINTMENT (OUTPATIENT)
Dept: ORTHOPEDIC SURGERY | Facility: CLINIC | Age: 58
End: 2023-04-27
Payer: COMMERCIAL

## 2023-04-27 PROCEDURE — 99024 POSTOP FOLLOW-UP VISIT: CPT

## 2023-04-27 PROCEDURE — 73562 X-RAY EXAM OF KNEE 3: CPT | Mod: RT

## 2023-05-03 NOTE — HISTORY OF PRESENT ILLNESS
[Chills] : no chills [Constipation] : no constipation [Diarrhea] : no diarrhea [Dysuria] : no dysuria [Fever] : no fever [Nausea] : no nausea [Vomiting] : no vomiting [de-identified] : s/p ORIF right tibial plateau, DOS 3/10/23 [de-identified] : Mr. FLORIN TAMEZ is a 57 y.o. gentleman presents to the office for his post op evaluation s/p ORIF right tibial plateau on 3/10/23. Since his surgery, he states that he is feeling the same. He is still having pain in the right lower extremity and right lower back. He is having right calf pain from the DVT and is currently on Eliquis 5 mg twice a day. He is having difficulty sleeping due to pain. His wife notes that he is not moving around much. He is taking oxycodone 5 mg every 4-6 hours for pain with minimal relief. He presented today in his [place].  He was diagnosed with a DVT the day of surgery.  The day prior to surgery he suddenly had calf pain.  Following ORIF a Doppler was done in the recovery room and a DVT was identified.  He has been on therapeutic anticoagulation.  The calf pain has been resolving.  He is NWB using crutches. [de-identified] : The patient is sitting comfortably in the exam room. \par RIGHT leg.\par -Skin is intact, no swelling, no ecchymosis\par -Incision is clean and dry, no erythema, no signs of infection \par -Range of motion knee 0-90\par -Stable to varus and valgus stress\par -Sensation is intact L1-S1\par -5/5 EHL, FHL, TA, GS, quadriceps, hamstrings\par -Foot is warm and well-perfused, palpable dorsalis pedis pulse  [de-identified] : No Xrays were taken in the office today, 3/23/23 [de-identified] : 57-year-old male s/p ORIF right tibial plateau, approximately 2 weeks out.  [de-identified] : -Nonweightbearing RLE\par -Continue [place] brace\par -Physical therapy for range of motion\par -Pain medication: Oxycodone 5mg, 1-2 Q4-6 hours along with Tylenol prn.  Ativan prescribed for nighttime anxiety. \par -Continue Eliquis for DVT\par -Follow up in 1 month with x-rays at that time.\par -Follow-up with hematology for recommendations of anticoagulation.\par -All the patient's questions and concerns were addressed during this visit

## 2023-05-25 ENCOUNTER — APPOINTMENT (OUTPATIENT)
Dept: ORTHOPEDIC SURGERY | Facility: CLINIC | Age: 58
End: 2023-05-25
Payer: COMMERCIAL

## 2023-05-25 PROCEDURE — 99024 POSTOP FOLLOW-UP VISIT: CPT

## 2023-05-25 PROCEDURE — 73562 X-RAY EXAM OF KNEE 3: CPT | Mod: RT

## 2023-08-10 ENCOUNTER — APPOINTMENT (OUTPATIENT)
Dept: ORTHOPEDIC SURGERY | Facility: CLINIC | Age: 58
End: 2023-08-10
Payer: COMMERCIAL

## 2023-08-10 VITALS
HEIGHT: 70 IN | SYSTOLIC BLOOD PRESSURE: 130 MMHG | DIASTOLIC BLOOD PRESSURE: 82 MMHG | BODY MASS INDEX: 30.78 KG/M2 | HEART RATE: 71 BPM | WEIGHT: 215 LBS

## 2023-08-10 PROCEDURE — 73562 X-RAY EXAM OF KNEE 3: CPT | Mod: RT

## 2023-08-10 PROCEDURE — 99214 OFFICE O/P EST MOD 30 MIN: CPT

## 2023-08-10 RX ORDER — MELOXICAM 15 MG/1
15 TABLET ORAL DAILY
Qty: 30 | Refills: 1 | Status: ACTIVE | COMMUNITY
Start: 2023-08-10 | End: 1900-01-01

## 2023-08-10 NOTE — HISTORY OF PRESENT ILLNESS
[de-identified] : Mr. FLORIN TAMEZ is a 57 year old gentleman presenting for follow up evaluation s/p ORIF right tibial plateau on 3/10/23. Since his last visit he states that he is feeling better. He notes right knee weakness when walking on uneven surfaces. He is having right ankle pain after prolonged walking.  He denies actual knee pain.  He reports that he walks with a limp due to heel and hip discomfort.

## 2023-08-10 NOTE — PHYSICAL EXAM
[de-identified] : The patient is sitting comfortably in the exam room.  RIGHT leg. -Skin is intact, no swelling, no ecchymosis -Incision is well-healed, no erythema, no signs of infection -Range of motion 5-100 -Patient walks with an abnormal gait.  He lacks pushoff from the affected side. -Negative Bam -Sensation is intact L1-S1 -5/5 EHL, FHL, TA, GS, quadriceps, hamstrings -Foot is warm and well-perfused, palpable dorsalis pedis pulse  [de-identified] : Xrays of the right knee were taken in the office today, 8/10/23.  AP, oblique, lateral.  X-rays show excellent overall alignment of the knee.  Implants are in good position.  No subsidence of the lateral plateau.  No lucencies around the screws.

## 2023-08-10 NOTE — DISCUSSION/SUMMARY
[de-identified] : 57-year-old male s/p ORIF right tibial plateau, 5 months out -X-ray and physical exam findings were discussed with the patient -Long discussion was held with the patient regarding working on stiffness.  He tends to push himself to a very mild discomfort and then he will stop.  I encouraged him to push himself while working on range of motion to a point where he has a little bit more discomfort and potentially even some pain in order to gain range of motion.  He is not driving yet.  His wife expressed concern regarding this.  I explained that when he decides to drive will be up to him and that I cannot provide a note or letter stating he can drive. -Weightbearing as tolerated right LE -Physical therapy: gait training.  We will get him into physical therapy primarily to attempt to normalize his gait is much as possible. -Home exercises: Seated flexion exercises to increase ROM of the right knee were demonstrated in the office today.  These included seated range of motion exercises for the knee and heel lifts. -Meloxicam to allow more aggressive physical therapy -Follow up in 2 months with x-rays of the right knee at that time. -All the patient's questions and concerns were addressed during this visit.

## 2023-08-30 NOTE — HISTORY OF PRESENT ILLNESS
[Chills] : no chills [Diarrhea] : no diarrhea [Constipation] : no constipation [Dysuria] : no dysuria [Fever] : no fever [Nausea] : no nausea [Vomiting] : no vomiting [de-identified] : s/p ORIF right tibial plateau, DOS 3/10/23 [de-identified] : Mr. FLORIN TAMEZ is a 57 y.o. gentleman presents to the office for his post op evaluation s/p ORIF right tibial plateau on 3/10/23. Since his last visit he states that he is feeling better. He notes some mild pain behind his right knee. He is taking Tylenol PM for the pain with relief. He presents in a Thang brace and is ambulating with bilateral crutches. He is still taking the Eliquis.  [de-identified] : The patient is sitting comfortably in the exam room.  RIGHT leg. -Skin is intact, no swelling, no ecchymosis -Incision is well-healed, no erythema, no signs of infection  -Range of motion 0-90 -Negative Lachman, negative anterior drawer, negative posterior drawer -Stable to varus and valgus stress -Sensation is intact L1-S1 -5/5 EHL, FHL, TA, GS, quadriceps, hamstrings -Foot is warm and well-perfused, palpable dorsalis pedis pulse  [de-identified] : Xrays of the right knee were taken in the office today, 4/27/23 AP, oblique, lateral.  X-rays show good overall alignment of the knee joint.  Implants are in good position.  No displacement of the fracture.  No lucencies around the screws. [de-identified] : 57-year-old male s/p ORIF right tibial plateau, approximately 7 weeks out.  [de-identified] : -Nonweightbearing RLE until 5/18/23 then WBAT RLE\par -Physical therapy prescribed to increase ROM\par -Home exercises demonstrated in the office today to prevent stiffness of the right knee. \par -Follow up in 4 weeks with x-rays at that time.\par -All the patient's questions and concerns were addressed during this visit

## 2023-08-30 NOTE — HISTORY OF PRESENT ILLNESS
[Chills] : no chills [Constipation] : no constipation [Diarrhea] : no diarrhea [Dysuria] : no dysuria [Fever] : no fever [Nausea] : no nausea [Vomiting] : no vomiting [de-identified] : s/p ORIF right tibial plateau, DOS 3/10/23 [de-identified] : Mr. FLORIN TAMEZ is a 57 y.o. gentleman presents to the office for his post op evaluation s/p ORIF right tibial plateau on 3/10/23. Since his last visit he states that he is feeling better. He is participating in PT 3 times a week. He does not require the use of pain medication. He is ambulating with bilateral crutches.  [de-identified] : The patient is sitting comfortably in the exam room.  RIGHT leg. -Skin is intact, no swelling, no ecchymosis -Incision is well-healed, no erythema, no signs of infection  -Range of motion 0-90 -Negative Lachman, negative anterior drawer, negative posterior drawer -Stable to varus valgus stress -Sensation is intact L1-S1 -5/5 EHL, FHL, TA, GS, quadriceps, hamstrings -Foot is warm and well-perfused, palpable dorsalis pedis pulse  [de-identified] : Xrays of the right knee were taken in the office today, 5/25/23 AP, oblique, lateral.  X-rays show good overall alignment of the knee.  There is interval healing of fracture lines.  There is no displacement of the fracture.  Implants are in good position.  No lucencies around the screws. [de-identified] : 57-year-old male s/p ORIF right tibial plateau, approximately 11 weeks out.  [de-identified] : -X-ray and physical exam findings were discussed with the patient -Long discussion was held with the patient regarding his stiffness.  His range of motion is less than what I would anticipate at this point -Weightbearing as tolerated right LE -Physical therapy to increase ROM. strength, gait training -Follow up in 3 weeks with x-rays at that time and to determine if he will undergo LAURO. -All the patient's questions and concerns were addressed during this visit

## 2023-10-12 ENCOUNTER — APPOINTMENT (OUTPATIENT)
Dept: ORTHOPEDIC SURGERY | Facility: CLINIC | Age: 58
End: 2023-10-12
Payer: COMMERCIAL

## 2023-10-12 VITALS
WEIGHT: 215 LBS | BODY MASS INDEX: 30.78 KG/M2 | TEMPERATURE: 93 F | HEIGHT: 70 IN | DIASTOLIC BLOOD PRESSURE: 80 MMHG | SYSTOLIC BLOOD PRESSURE: 113 MMHG

## 2023-10-12 PROCEDURE — 73562 X-RAY EXAM OF KNEE 3: CPT | Mod: RT

## 2023-10-12 PROCEDURE — 99213 OFFICE O/P EST LOW 20 MIN: CPT

## 2023-11-16 ENCOUNTER — APPOINTMENT (OUTPATIENT)
Dept: ORTHOPEDIC SURGERY | Facility: CLINIC | Age: 58
End: 2023-11-16
Payer: COMMERCIAL

## 2023-11-16 VITALS — HEIGHT: 70 IN | BODY MASS INDEX: 30.78 KG/M2 | WEIGHT: 215 LBS

## 2023-11-16 PROCEDURE — 73562 X-RAY EXAM OF KNEE 3: CPT | Mod: RT

## 2023-11-16 PROCEDURE — 99213 OFFICE O/P EST LOW 20 MIN: CPT

## 2023-11-22 ENCOUNTER — RX RENEWAL (OUTPATIENT)
Age: 58
End: 2023-11-22

## 2023-11-22 RX ORDER — MELOXICAM 15 MG/1
15 TABLET ORAL DAILY
Qty: 30 | Refills: 0 | Status: ACTIVE | COMMUNITY
Start: 2023-10-12 | End: 1900-01-01

## 2024-04-18 ENCOUNTER — APPOINTMENT (OUTPATIENT)
Dept: ORTHOPEDIC SURGERY | Facility: CLINIC | Age: 59
End: 2024-04-18
Payer: COMMERCIAL

## 2024-04-18 DIAGNOSIS — S82.141A DISPLACED BICONDYLAR FRACTURE OF RIGHT TIBIA, INITIAL ENCOUNTER FOR CLOSED FRACTURE: ICD-10-CM

## 2024-04-18 PROCEDURE — 73562 X-RAY EXAM OF KNEE 3: CPT | Mod: RT

## 2024-04-18 PROCEDURE — 99213 OFFICE O/P EST LOW 20 MIN: CPT

## 2024-06-07 NOTE — DISCUSSION/SUMMARY
[de-identified] : 58-year-old male s/p ORIF right tibial plateau, approximately 1 year and 1 month out  -X-ray and physical exam findings were discussed with the patient -Weightbearing as tolerated right LE -Physical therapy: gait training and strengthening -Follow up as needed with x-rays of the right knee at that time. -All the patient's questions and concerns were addressed during this visit.

## 2024-06-07 NOTE — PHYSICAL EXAM
[de-identified] : The patient is sitting comfortably in the exam room.  RIGHT leg. -Skin is intact, no swelling, no ecchymosis -Incision is well-healed, no erythema, no signs of infection -Range of motion 0-120 -Sensation is intact L1-S1 -5/5 EHL, FHL, TA, GS, quadriceps, hamstrings -Foot is warm and well-perfused, palpable dorsalis pedis pulse  [de-identified] : Xrays of the right knee were taken in the office today, 4/18/24.  AP, oblique, lateral.  X-rays show excellent overall alignment of the knee.  Implants are in good position.  No subsidence of the lateral plateau.  No lucencies around the screws.

## 2024-06-07 NOTE — HISTORY OF PRESENT ILLNESS
[de-identified] : Mr. FLORIN TAMEZ is a 58 year old gentleman presenting for follow up evaluation s/p ORIF right tibial plateau on 3/10/23. Since his last visit he states that he is feeling better. He has been walking and using a stationary bike for exercise and participating in PT twice a week. He denies right knee pain and is happy with his progress.

## 2024-08-21 NOTE — PROGRESS NOTE ADULT - PROBLEM SELECTOR PLAN 2
Patient started on Eliquis 5mg BID  keep leg elevated  warm soaks to calf as needed  follow for any sign of bleeding.
Patient started on Eliquis 5mg BID  keep leg elevated  warm soaks to calf as needed  follow for any sign of bleeding.
none

## (undated) DEVICE — SYR LUER LOK 10CC

## (undated) DEVICE — STAPLER SKIN VISI-STAT 35 WIDE

## (undated) DEVICE — BRACE KNEE IMMOBILIZER SPLINT SUPER LARGE 20"

## (undated) DEVICE — SOL IRR POUR H2O 250ML

## (undated) DEVICE — GLV 7.5 PROTEXIS (WHITE)

## (undated) DEVICE — WARMING BLANKET UPPER ADULT

## (undated) DEVICE — TOURNIQUET CUFF 34" DUAL PORT W PLC

## (undated) DEVICE — GLV 8 PROTEXIS (BLUE)

## (undated) DEVICE — DRILL BIT S&N  EVOS SMALL W/AO QC LONG 2.5MM

## (undated) DEVICE — SUT POLYSORB 0 30" GS-21 UNDYED

## (undated) DEVICE — PACK EXTREMITY

## (undated) DEVICE — DRSG CURITY GAUZE SPONGE 4 X 4" 12-PLY

## (undated) DEVICE — POSITIONER FOAM EGG CRATE ULNAR 2PCS (PINK)

## (undated) DEVICE — SOL IRR POUR NS 0.9% 500ML

## (undated) DEVICE — DRAPE IOBAN 23" X 23"

## (undated) DEVICE — GLV 8.5 PROTEXIS (WHITE)

## (undated) DEVICE — SUT POLYSORB 2-0 30" GS-21 UNDYED

## (undated) DEVICE — DRAPE C ARM UNIVERSAL

## (undated) DEVICE — VENODYNE/SCD SLEEVE CALF LARGE

## (undated) DEVICE — PREP CHLORAPREP HI-LITE ORANGE 26ML

## (undated) DEVICE — DRSG XEROFORM 1 X 8"

## (undated) DEVICE — DRSG WEBRIL 6"

## (undated) DEVICE — SPECIMEN CONTAINER 100ML

## (undated) DEVICE — NDL HYPO SAFE 22G X 1.5" (BLACK)

## (undated) DEVICE — DRAPE MAYO STAND 30"

## (undated) DEVICE — GLV 8 PROTEXIS (WHITE)

## (undated) DEVICE — ELCTR BOVIE PENCIL SMOKE EVACUATION